# Patient Record
(demographics unavailable — no encounter records)

---

## 2017-10-30 NOTE — MM
Reason for exam: screening  (asymptomatic).

Last mammogram was performed 1 year and 7 months ago.



History:

Patient is postmenopausal.



Physical Findings:

A clinical breast exam by your physician is recommended on an annual basis and 

results should be correlated with mammographic findings.



MG Screening Mammo w CAD

Bilateral CC and MLO view(s) were taken.

Prior study comparison: March 17, 2016, mammogram, performed at [facility].

There are scattered fibroglandular densities.  Asymmetric breast tissue in the 

left breast.  No significant changes when compared with prior studies.





ASSESSMENT: Benign, BI-RAD 2



RECOMMENDATION:

Routine screening mammogram of both breasts in 1 year.

## 2019-04-30 NOTE — MM
Reason for exam: screening  (asymptomatic).

Last mammogram was performed 1 year and 6 months ago.



History:

Patient is postmenopausal.



Physical Findings:

A clinical breast exam by your physician is recommended on an annual basis and 

results should be correlated with mammographic findings.



MG Screening Mammo w CAD

Bilateral CC and MLO view(s) were taken.

Prior study comparison: October 23, 2017, bilateral MG screening mammo w CAD.  

March 17, 2016, mammogram, performed at [facility].

There are scattered fibroglandular densities.  No significant changes when 

compared with prior studies.





ASSESSMENT: Negative, BI-RAD 1



RECOMMENDATION:

Routine screening mammogram of both breasts in 1 year.

## 2019-07-08 NOTE — CT
EXAMINATION TYPE: CT lumbar spine wo con

 

DATE OF EXAM: 7/8/2019

 

COMPARISON:

 

HISTORY: Pain since pain stimulator replacement 6 months ago.

 

CT DLP: 1197.6 mGycm

 

CONTRAST: 

CT scan of the lumbar is performed , patient injected with mL of .

 

TECHNIQUE: CT of the lumbar spine is performed on a spiral scan at 3 mm thick sections. Reconstructed
 images are performed in the coronal and sagittal planes.  

 

FINDINGS: 

 

T11-T12: There is some right paracentral disc bulging with mild anterior thecal sac compression. Luisito
elate with right radicular symptoms.

 

T12-L1: No focal disc herniation or significant disc bulge is evident.   No spinal canal stenosis or 
neural foraminal stenosis is present.

 

L1-L2: No focal disc herniation or significant disc bulge is evident.   No spinal canal stenosis or n
eural foraminal stenosis is present

 

L2-L3: Minimal disc bulge may have anterior thecal sac contact. Facet hypertrophy is present. No spin
al canal stenosis or neural foraminal stenosis is present

 

L3-L4: Mild disc bulge has anterior thecal sac contact. No AP spinal canal stenosis present. Facet hy
pertrophy is present. Neural foramen are patent

 

L4-L5: Broad-based disc bulge has anterior thecal sac contact. No spinal canal stenosis is present. T
here is moderate left and mild right foraminal narrowing due to disc bulging and facet hypertrophy. S
ome posterior lateral thecal sac contact from facet hypertrophy and ligamentum flavum laxity is prese
nt.

 

L5-S1: Some hard disc material has anterior thecal sac contact at L5-S1. There is narrowing of disc h
eight with some vacuum disc phenomenon. No spinal canal stenosis present. Facet changes are present.

 

Vertebral alignment appears normal. Note is made of aortoiliac stenting sacroiliac joint degenerative
 changes are also present.

 

Patient's thoracic stimulator is out of the field-of-view. On the  images of the leads enter in 
the lower thoracic region are out of the field-of-view is well.

 

IMPRESSION:

Disc bulging appears greatest at L4-5 with anterior thecal sac contact and may be contributing to nadege
e moderate left foraminal stenosis. Correlate with the radicular symptoms.

2. Note is made of some right paracentral disc bulging T11-T12. Correlate with right radicular sympto
ms at this level.

## 2019-07-08 NOTE — XR
EXAM TYPE: LUMBAR SPINE X RAY SERIES

 

COMPARISON: None

 

HISTORY: Pain

 

TECHNIQUE: 4 views are submitted.

 

FINDINGS:

Alignment is anatomic.  The pedicles are intact.  The transverse processes are intact.  There is a pa
in stimulator wires seen. Surgical clips in the gallbladder fossa. Multilevel degenerative disc disea
se seen. Vascular stent noted. There is facet arthropathy at multiple levels most marked at L5-S1. No
 compression deformities. Anterior hypertrophic spurring noted.

 

IMPRESSION:

1. Multilevel degenerative disc disease and facet arthropathy

## 2019-08-21 NOTE — CT
EXAMINATION TYPE: CT angio abd aorta w/Runoff

 

DATE OF EXAM: 8/21/2019

 

HISTORY: Occulsion disease lower extremities

 

CT DLP: 2051.8 mGycm

 

CONTRAST: 

CTA thoracic and abdominal aorta with 3-D reconstruction is performed and without and with IV Contras
t, patient injected with 125 mL of Isovue 370.

 

Contrast CTA of the abdominal aorta with runoff of the lower extremity arterial system was performed 
from the lung bases through the ankles and feet. 3-D reconstruction imaging obtained at a separate wo
rkstation.  

 

Abdominal aorta and iliac arteries: Aortic bifemoral bypass graft is noted to be in place and patent.
 No evidence for aneurysm or leak. No pathologic enhancement to suggest infection. Celiac, SMA and re
nal arteries are patent.

 

Femoral arteries: Common femoral arteries and profunda femoris arteries are patent bilaterally with o
nly minimal plaque formation seen. No hemodynamically significant stenosis identified.

 

Popliteal arteries: Popliteal arteries are patent bilaterally with only minimal plaque formation note
d.

 

Below the knee arteries: Trifurcation is patent bilaterally. Peroneal, anterior and posterior tibial 
arteries demonstrate minimal calcific disease without evidence for hemodynamically significant stenos
is. Limited runoff of the ankles and feet given timing of the contrast bolus.

 

LIVER/GB-hepatomegaly with underlying hepatic steatosis.

 

PANCREAS-  No significant abnormality is seen. 

 

SPLEEN-  No significant abnormality is seen. 

 

ADRENALS-  No significant abnormality is seen. 

 

KIDNEYS/BLADDER-  No significant abnormality is seen. 

 

BOWEL-  No Significant abnormality 

 

GENITAL ORGANS: No gross abnormality seen. 

 

LYMPH NODES-  No greater than 1cm abdominal or pelvic lymph nodes are appreciated. 

 

OSSEOUS STRUCTURES-generative changes lumbar spine.

 

OTHER- No significant abnormality is seen. 

 

IMPRESSION-

 

1. Aortobifemoral bypass graft is patent and intact without evidence for aneurysm, leak or infection.
 Minimal plaque noted without hemodynamically significant stenosis of the runoff vessels.

2. Hepatomegaly with underlying hepatic steatosis.

## 2019-11-17 NOTE — CT
EXAMINATION TYPE: CT angio chest

 

DATE OF EXAM: 11/17/2019 1:23 PM

 

COMPARISON: None.

 

HISTORY: Chest pain, SOB, recent surgery

 

CT DLP: 449 mGycm

Automated exposure control for dose reduction was used.

 

CONTRAST: 

CTA scan of the thorax is performed with IV Contrast, patient injected with 69 mL of Isovue 370, pulm
onary embolism protocol. .  

 

FINDINGS: There are diffuse emphysematous changes throughout the lungs. There is groundglass opacity 
throughout the lungs the aorta is normal in caliber without evidence of dissection. There is no pleur
al or pericardial fluid. The heart is not enlarged.

 

Visualized portions of the upper abdomen are unremarkable.

 

There is hypertrophic spondylosis within the spine.

 

IMPRESSION: 

1. THIS EXAMINATION IS NEGATIVE FOR PULMONARY EMBOLUS.

2. DIFFUSE BULLOUS EMPHYSEMATOUS CHANGE WITH GROUNDGLASS OPACITY SUGGESTING ONGOING ALVEOLITIS.

## 2019-11-17 NOTE — P.HPIM
History of Present Illness


H&P Date: 11/17/19


Chief Complaint: Chest and arm pain


65-year-old female presenting to the emergency Department with complaints of 

chest discomfort.  Discomfort feels like an ache.  Onset of symptoms was a day 

or so ago.  Discomfort is right-sided with radiation through to the back.  

Discomfort also rates the shoulder and down the arm.  Patient may feel slightly 

short of breath.  Patient has had cough recently with white sputum.  No fevers. 

No nausea.  No history of similar symptoms previously.  Chest discomfort does 

increase with cough and occasionally deep breaths.


Workup in ED with EKG showing normal sinus rhythm no acute ST-T wave changes; 

lab work essentially unremarkable except for slight hyperglycemia of 130 and an 

elevated d-dimer of 0.9; troponin is found to be less than 0.012; CT of the 

chest was done which was negative for PE


Patient is being admitted to hospital for further workup on chest pain





Review of Systems











REVIEW OF SYSTEMS: 


CONSTITUTIONAL: No fever, no malaise, no fatigue. 


HEENT: No recent visual problems or hearing problems. Denied any sore throat. 


CARDIOVASCULAR: No chest pain, orthopnea, PND, no palpitations, no syncope. 


PULMONARY: No shortness of breath, no cough, no hemoptysis. 


GASTROINTESTINAL: No diarrhea, no nausea, no vomiting, no abdominal pain. 


NEUROLOGICAL: No headaches, no weakness, no numbness. 


HEMATOLOGICAL: Denies any bleeding or petechiae. 


GENITOURINARY: Denies any burning micturition, frequency, or urgency. 


MUSCULOSKELETAL/RHEUMATOLOGICAL: Denies any joint pain, swelling, or any muscle 

pain. 


ENDOCRINE: Denies any polyuria or polydipsia. 





The rest of the 14-point review of systems is negative.





Past Medical History


Past Medical History: Coronary Artery Disease (CAD), GERD/Reflux, Hypertension, 

Liver Disease


Additional Past Medical History / Comment(s): NERVE DAMAGE AND PAIN, anemia


History of Any Multi-Drug Resistant Organisms: None Reported


Past Surgical History: Cholecystectomy, Hysterectomy, Orthopedic Surgery, 

Tonsillectomy


Additional Past Surgical History / Comment(s): AORTIC SURGERY, pt has a pain 

stimulator and it has been moved, B heels, L femoral endartectomy


Past Psychological History: Depression


Smoking Status: Former smoker


Past Alcohol Use History: None Reported


Past Drug Use History: None Reported





Medications and Allergies


                                Home Medications











 Medication  Instructions  Recorded  Confirmed  Type


 


Cetirizine HCl [Zyrtec] 10 mg PO DAILY 11/17/19 11/17/19 History


 


Cholecalciferol [Vitamin D3 (25 1,000 unit PO DAILY 11/17/19 11/17/19 History





Mcg = 1000 Iu)]    


 


Ferrous Sulfate [Feosol] 325 mg PO DAILY 11/17/19 11/17/19 History


 


Hydrochlorothiazide [Hydrodiuril] 25 mg PO DAILY 11/17/19 11/17/19 History


 


Pantoprazole Sodium [Protonix] 40 mg PO DAILY 11/17/19 11/17/19 History


 


Sertraline [Zoloft] 100 mg PO DAILY 11/17/19 11/17/19 History








                                    Allergies











Allergy/AdvReac Type Severity Reaction Status Date / Time


 


albuterol Allergy  Rash/Hives Verified 11/17/19 13:05


 


cortisone Allergy  Swelling Verified 11/17/19 13:05


 


erythromycin base Allergy  Rash/Hives Verified 11/17/19 13:05


 


fentanyl [From Duragesic] Allergy  Unknown Verified 11/17/19 13:05





   Childhood  


 


iodine Allergy  Anaphylaxis Verified 11/17/19 13:05


 


modafinil Allergy  Unknown Verified 11/17/19 13:05


 


nystatin Allergy  Rash/Hives Verified 11/17/19 13:05


 


paroxetine [From Paxil] Allergy  Unknown Verified 11/17/19 13:05


 


vancomycin Allergy  Rash/Hives Verified 11/17/19 13:05


 


aspirin AdvReac  Unknown Verified 11/17/19 13:05


 


morphine AdvReac  Diarrhea Verified 11/17/19 13:05


 


oxcarbazepine AdvReac  Unknown Verified 11/17/19 13:05





[From Trileptal]     


 


procaine [From Novocain] AdvReac  Swelling Verified 11/17/19 13:05














Physical Exam


Vitals: 


                                   Vital Signs











  Temp Pulse Resp BP Pulse Ox


 


 11/17/19 15:30   68  16  154/74  92 L


 


 11/17/19 15:00   68  18  158/81  91 L


 


 11/17/19 14:30   88  16  152/80  87 L


 


 11/17/19 14:00   66  16  151/66  89 L


 


 11/17/19 13:49   79  16  151/66  95


 


 11/17/19 13:30   74  16  148/92  88 L


 


 11/17/19 13:00   77  16  159/87  93 L


 


 11/17/19 12:47   72  20  159/87  96


 


 11/17/19 12:30   76  16  96/58  96


 


 11/17/19 12:00   79  15  167/81  95


 


 11/17/19 11:30   77  16   94 L


 


 11/17/19 11:28  98.1 F  80  16  166/86  94 L


 


 11/17/19 10:44  98.1 F  78  18  193/81  95








                                Intake and Output











 11/17/19 11/17/19 11/17/19





 06:59 14:59 22:59


 


Other:   


 


  Weight  87.997 kg 














- Constitutional


General appearance: Present: average body habitus, cooperative, no acute 

distress


- EENT


Eyes: Present: anicteric sclerae, EOMI, PERRLA, normal appearance


ENT: Present: hearing grossly normal, normal oropharynx


Ears: bilateral: normal


- Neck


Neck: Present: normal ROM.  Absent: lymphadenopathy, rigidity, thyromegaly


Carotids: negative: bruit present


Thyroid: bilateral: normal size, negative: enlarged, nodule


- Respiratory


Respiratory: bilateral: CTA, negative: rales, rhonchi, wheezing


- Cardiovascular


Rhythm: regular


Heart sounds: normal: S1, S2


Abnormal Heart Sounds: Absent: systolic murmur, diastolic murmur


- Gastrointestinal


General gastrointestinal: Present: normal bowel sounds, soft.  Absent: diste

nded, organomegaly, tenderness


- Genitourinary


Genitourinary Comment(s): deferred


- Integumentary


Integumentary: Present: normal turgor.  Absent: jaundiced, rash, ulcer


- Neurologic


Neurologic: Present: CNII-XII intact.  Absent: focal deficits


- Musculoskeletal


Musculoskeletal: Present: gait normal, strength equal bilaterally


- Psychiatric


Psychiatric: Present: A&O x's 3, appropriate affect, intact judgment & insight








Results


CBC & Chem 7: 


                                 11/17/19 11:26





                                 11/17/19 11:26


Labs: 


                  Abnormal Lab Results - Last 24 Hours (Table)











  11/17/19 11/17/19 Range/Units





  11:26 11:26 


 


D-Dimer   0.90 H  (<0.60)  mg/L FEU


 


Glucose  130 H   (74-99)  mg/dL


 


AST  52 H   (14-36)  U/L


 


ALT  70 H   (9-52)  U/L














Assessment and Plan


Assessment: 


1.  Chest pain rule out acute coronary syndrome


- We will admit patient to selective care; we will monitor and trend troponin 

every 43 with continuous monitoring of EKG; start patient on aspirin and 

nitrates; 2-D echocardiogram; consult cardiology for further recommendations





2.  Acute exacerbation COPD; patient was given Solu-Medrol 125 mg 1 in ED; we 

will continue with Solu-Medrol 40 mg IV twice a day with bronchodilator 

nebulizer treatments





3.  Hypertension; continue with home dose of hydrochlorothiazide 25 mg daily





4.  Gastroesophageal reflux disease; Tenex 40 mg daily





5.  Depression; Zoloft 100 mg daily





DVT prophylaxis; SCDs





CODE STATUS; full code





Time with Patient: Greater than 30

## 2019-11-17 NOTE — XR
EXAMINATION TYPE: XR chest 2V

 

DATE OF EXAM: 11/17/2019

 

HISTORY: Chest Pain.

 

REFERENCE: Previous study dated 3/25/2014.

 

FINDINGS: Pain stimulator projects over the mid thoracic spine.

 

The lungs are clear. Pleural spaces are clear. Heart size upper limits of normal.

 

IMPRESSION: 

 

BORDERLINE CARDIOMEGALY.

## 2019-11-17 NOTE — ED
General Adult HPI





- General


Chief complaint: Chest Pain


Stated complaint: chest/back/arm pain


Time Seen by Provider: 11/17/19 10:44


Source: patient, family, RN notes reviewed


Mode of arrival: wheelchair


Limitations: no limitations





- History of Present Illness


Initial comments: 





Patient is a pleasant 65-year-old female presenting to the emergency Department 

with complaints of chest discomfort.  Discomfort feels like an ache.  Onset of 

symptoms was a day or so ago.  Discomfort is right-sided with radiation through 

to the back.  Discomfort also rates the shoulder and down the arm.  Patient may 

feel slightly short of breath.  Patient has had cough recently with white 

sputum.  No fevers.  No nausea.  No history of similar symptoms previously.  

Chest discomfort does increase with cough and occasionally deep breaths.





- Related Data


                                Home Medications











 Medication  Instructions  Recorded  Confirmed


 


Cetirizine HCl [Zyrtec] 10 mg PO DAILY 11/17/19 11/17/19


 


Cholecalciferol [Vitamin D3 (25 1,000 unit PO DAILY 11/17/19 11/17/19





Mcg = 1000 Iu)]   


 


Ferrous Sulfate [Feosol] 325 mg PO DAILY 11/17/19 11/17/19


 


Hydrochlorothiazide [Hydrodiuril] 25 mg PO DAILY 11/17/19 11/17/19


 


Pantoprazole Sodium [Protonix] 40 mg PO DAILY 11/17/19 11/17/19


 


Sertraline [Zoloft] 100 mg PO DAILY 11/17/19 11/17/19











                                    Allergies











Allergy/AdvReac Type Severity Reaction Status Date / Time


 


albuterol Allergy  Rash/Hives Verified 11/17/19 13:05


 


cortisone Allergy  Swelling Verified 11/17/19 13:05


 


erythromycin base Allergy  Rash/Hives Verified 11/17/19 13:05


 


fentanyl [From Duragesic] Allergy  Unknown Verified 11/17/19 13:05





   Childhood  


 


iodine Allergy  Anaphylaxis Verified 11/17/19 13:05


 


modafinil Allergy  Unknown Verified 11/17/19 13:05


 


nystatin Allergy  Rash/Hives Verified 11/17/19 13:05


 


paroxetine [From Paxil] Allergy  Unknown Verified 11/17/19 13:05


 


vancomycin Allergy  Rash/Hives Verified 11/17/19 13:05


 


aspirin AdvReac  Unknown Verified 11/17/19 13:05


 


morphine AdvReac  Diarrhea Verified 11/17/19 13:05


 


oxcarbazepine AdvReac  Unknown Verified 11/17/19 13:05





[From Trileptal]     


 


procaine [From Novocain] AdvReac  Swelling Verified 11/17/19 13:05














Review of Systems


ROS Statement: 


Those systems with pertinent positive or pertinent negative responses have been 

documented in the HPI.





ROS Other: All systems not noted in ROS Statement are negative.


Constitutional: Denies: fever


Eyes: Denies: eye pain


ENT: Denies: ear pain


Respiratory: Reports: as per HPI, cough


Cardiovascular: Reports: chest pain


Endocrine: Denies: fatigue


Gastrointestinal: Denies: abdominal pain


Genitourinary: Denies: dysuria


Musculoskeletal: Reports: as per HPI


Skin: Denies: rash


Neurological: Denies: weakness





Past Medical History


Past Medical History: Coronary Artery Disease (CAD), GERD/Reflux, Hypertension, 

Liver Disease


Additional Past Medical History / Comment(s): NERVE DAMAGE AND PAIN, anemia


History of Any Multi-Drug Resistant Organisms: None Reported


Past Surgical History: Cholecystectomy, Hysterectomy, Orthopedic Surgery, T

onsillectomy


Additional Past Surgical History / Comment(s): AORTIC SURGERY, pt has a pain 

stimulator and it has been moved, B heels, L femoral endartectomy


Past Psychological History: Depression


Smoking Status: Former smoker


Past Alcohol Use History: None Reported


Past Drug Use History: None Reported





General Exam


Limitations: no limitations


General appearance: alert, in no apparent distress


Head exam: Present: normocephalic


Eye exam: Present: normal appearance


Neck exam: Present: normal inspection


Respiratory exam: Present: normal lung sounds bilaterally.  Absent: chest wall 

tenderness


Cardiovascular Exam: Present: regular rate, normal rhythm


  ** Expanded


Peripheral pulses: 2+: Radial (R), Radial (L), Posterior Tibialis (R), Posterior

Tibialis (L)


GI/Abdominal exam: Present: soft.  Absent: tenderness


Extremities exam: Present: normal inspection.  Absent: pedal edema, calf 

tenderness


Back exam: Present: normal inspection.  Absent: tenderness


Neurological exam: Present: alert


Psychiatric exam: Present: normal affect, normal mood


Skin exam: Present: normal color





Course


                                   Vital Signs











  11/17/19 11/17/19 11/17/19





  10:44 11:28 12:47


 


Temperature 98.1 F 98.1 F 


 


Pulse Rate 78 79 72


 


Respiratory 18 20 20





Rate   


 


Blood Pressure 193/81 166/86 159/87


 


O2 Sat by Pulse 95 95 96





Oximetry   














  11/17/19





  13:49


 


Temperature 


 


Pulse Rate 79


 


Respiratory 16





Rate 


 


Blood Pressure 151/66


 


O2 Sat by Pulse 95





Oximetry 














- Reevaluation(s)


Reevaluation #1: 





11/17/19 12:26


Patient reevaluated and resting comfortably in bed.  Patient states no 

improvement of symptoms and symptoms are more midline in her chest at this 

point.  Patient states aspirin reported ALLERGY was from history of stomach 

ulcers.  Patient and family updated on need for this as well as need for 

computed tomography scan with iodine.  They are aware this is the best available

test and in agreement.





EKG Findings





- EKG Comments:


EKG Findings:: sinus rhythm 71.  .  QRS 76.  QT 44.  .  Normal 

axis.  Normal QRS.  No acute ST change.





Medical Decision Making





- Medical Decision Making





Patient reevaluated and updated.  Patient is now resting comfortably in bed.  

Case was discussed in detail with Dr. Sahu, who will admit covering for this. 

Patient.  Cardiology will be placed on consult.





- Lab Data


Result diagrams: 


                                 11/17/19 11:26





                                 11/17/19 11:26


                                   Lab Results











  11/17/19 11/17/19 11/17/19 Range/Units





  11:26 11:26 11:26 


 


WBC  4.7    (3.8-10.6)  k/uL


 


RBC  5.24    (3.80-5.40)  m/uL


 


Hgb  14.5    (11.4-16.0)  gm/dL


 


Hct  43.4    (34.0-46.0)  %


 


MCV  82.8    (80.0-100.0)  fL


 


MCH  27.7    (25.0-35.0)  pg


 


MCHC  33.5    (31.0-37.0)  g/dL


 


RDW  14.6    (11.5-15.5)  %


 


Plt Count  169    (150-450)  k/uL


 


Neutrophils %  62    %


 


Lymphocytes %  27    %


 


Monocytes %  5    %


 


Eosinophils %  3    %


 


Basophils %  1    %


 


Neutrophils #  3.0    (1.3-7.7)  k/uL


 


Lymphocytes #  1.3    (1.0-4.8)  k/uL


 


Monocytes #  0.2    (0-1.0)  k/uL


 


Eosinophils #  0.1    (0-0.7)  k/uL


 


Basophils #  0.0    (0-0.2)  k/uL


 


PT    9.9  (9.0-12.0)  sec


 


INR    0.9  (<1.2)  


 


APTT    23.1  (22.0-30.0)  sec


 


D-Dimer    0.90 H  (<0.60)  mg/L FEU


 


Sodium   139   (137-145)  mmol/L


 


Potassium   3.5   (3.5-5.1)  mmol/L


 


Chloride   102   ()  mmol/L


 


Carbon Dioxide   27   (22-30)  mmol/L


 


Anion Gap   10   mmol/L


 


BUN   16   (7-17)  mg/dL


 


Creatinine   0.82   (0.52-1.04)  mg/dL


 


Est GFR (CKD-EPI)AfAm   87   (>60 ml/min/1.73 sqM)  


 


Est GFR (CKD-EPI)NonAf   75   (>60 ml/min/1.73 sqM)  


 


Glucose   130 H   (74-99)  mg/dL


 


Calcium   9.9   (8.4-10.2)  mg/dL


 


Magnesium   1.6   (1.6-2.3)  mg/dL


 


Total Bilirubin   0.6   (0.2-1.3)  mg/dL


 


AST   52 H   (14-36)  U/L


 


ALT   70 H   (9-52)  U/L


 


Alkaline Phosphatase   114   ()  U/L


 


Troponin I     (0.000-0.034)  ng/mL


 


NT-Pro-B Natriuret Pep     pg/mL


 


Total Protein   7.3   (6.3-8.2)  g/dL


 


Albumin   4.5   (3.5-5.0)  g/dL














  11/17/19 11/17/19 Range/Units





  11:26 11:26 


 


WBC    (3.8-10.6)  k/uL


 


RBC    (3.80-5.40)  m/uL


 


Hgb    (11.4-16.0)  gm/dL


 


Hct    (34.0-46.0)  %


 


MCV    (80.0-100.0)  fL


 


MCH    (25.0-35.0)  pg


 


MCHC    (31.0-37.0)  g/dL


 


RDW    (11.5-15.5)  %


 


Plt Count    (150-450)  k/uL


 


Neutrophils %    %


 


Lymphocytes %    %


 


Monocytes %    %


 


Eosinophils %    %


 


Basophils %    %


 


Neutrophils #    (1.3-7.7)  k/uL


 


Lymphocytes #    (1.0-4.8)  k/uL


 


Monocytes #    (0-1.0)  k/uL


 


Eosinophils #    (0-0.7)  k/uL


 


Basophils #    (0-0.2)  k/uL


 


PT    (9.0-12.0)  sec


 


INR    (<1.2)  


 


APTT    (22.0-30.0)  sec


 


D-Dimer    (<0.60)  mg/L FEU


 


Sodium    (137-145)  mmol/L


 


Potassium    (3.5-5.1)  mmol/L


 


Chloride    ()  mmol/L


 


Carbon Dioxide    (22-30)  mmol/L


 


Anion Gap    mmol/L


 


BUN    (7-17)  mg/dL


 


Creatinine    (0.52-1.04)  mg/dL


 


Est GFR (CKD-EPI)AfAm    (>60 ml/min/1.73 sqM)  


 


Est GFR (CKD-EPI)NonAf    (>60 ml/min/1.73 sqM)  


 


Glucose    (74-99)  mg/dL


 


Calcium    (8.4-10.2)  mg/dL


 


Magnesium    (1.6-2.3)  mg/dL


 


Total Bilirubin    (0.2-1.3)  mg/dL


 


AST    (14-36)  U/L


 


ALT    (9-52)  U/L


 


Alkaline Phosphatase    ()  U/L


 


Troponin I   <0.012  (0.000-0.034)  ng/mL


 


NT-Pro-B Natriuret Pep  100   pg/mL


 


Total Protein    (6.3-8.2)  g/dL


 


Albumin    (3.5-5.0)  g/dL














- Radiology Data


Radiology results: report reviewed (Chest CT concerning for alveolar ), image 

reviewed (Chest x-ray shows borderline cardiomegaly)





Disposition


Clinical Impression: 


 Chest pain, Pulmonary alveolitis





Disposition: ADMITTED AS IP TO THIS HOSP


Is patient prescribed a controlled substance at d/c from ED?: No


Referrals: 


[facility] [Primary Care Provider] - 1-2 days


Decision Time: 14:48

## 2019-11-18 NOTE — P.CRDCN
History of Present Illness


Consult date: 11/18/19


Chief complaint: Chest pain


History of present illness: 





This is a pleasant 65-year-old female patient with a past medical history 

significant for hypertension and history of peripheral arterial disease where 

the patient underwent in the past and aortobifem bypass presented to the 

hospital complaining of chest discomfort.  The patient was in her usual state of

health until yesterday when she was making bread at home and started 

experiencing chest discomfort, she describes mainly over the right side of the 

chest, with some radiation to the arm, and she described the discomfort as sharp

kind of discomfort.  No associated symptoms of shortness of breath, dizziness, 

sweating, heart racing or fluttering, or syncope.  The chest discomfort con

tinues and because of that she decided to come to the emergency room.  The EKG 

showed sinus rhythm without any significant ST or T-wave abnormalities.  The 

blood work came in to be unremarkable into of troponin.  D-dimer came in to be 

elevated but the computed tomography scan of the chest showed no acute PE.  The 

rest of her blood work came in to be unremarkable.  The chest x-ray came in to 

be unremarkable as well.  Please note that the blood pressure has been elevated 

when she presented to the hospital and during her hospital stay.  Currently she 

is on hydrochlorothiazide which was restarted.  The patient stated that she 

tried multiple blood pressure medication in the past including lisinopril, 

Norvasc, metoprolol, and labetalol, and she could not tolerate any of them.  She

had them written down.  Also she stated that she was admitted to [facility] in March 2019 and she underwent over the last stress 

test and echocardiogram, she was seen by Dr. Dillard in the office.  He 

advised no further workup.  The patient stated that she was told that she 

doesn't need to have heart catheterization but the history is a bit vague and I 

am going to obtain her previous medical records from [facility] as well as from the office.  Currently she still have chest discomfort 

but the pressure continues to be elevated.





Past Medical History


Past Medical History: Coronary Artery Disease (CAD), GERD/Reflux, Hypertension, 

Liver Disease


Additional Past Medical History / Comment(s): NERVE DAMAGE AND PAIN, anemia


History of Any Multi-Drug Resistant Organisms: None Reported


Past Surgical History: Cholecystectomy, Hysterectomy, Orthopedic Surgery, 

Tonsillectomy


Additional Past Surgical History / Comment(s): AORTIC SURGERY, pt has a pain 

stimulator and it has been moved, B heels, L femoral endartectomy


Past Anesthesia/Blood Transfusion Reactions: No Reported Reaction


Past Psychological History: Depression


Smoking Status: Former smoker


Past Alcohol Use History: None Reported


Past Drug Use History: None Reported





- Past Family History


  ** Mother


Family Medical History: No Reported History





  ** Father


Family Medical History: Coronary Artery Disease (CAD)





Medications and Allergies


                                Home Medications











 Medication  Instructions  Recorded  Confirmed  Type


 


Cetirizine HCl [Zyrtec] 10 mg PO DAILY 11/17/19 11/17/19 History


 


Cholecalciferol [Vitamin D3 (25 1,000 unit PO DAILY 11/17/19 11/17/19 History





Mcg = 1000 Iu)]    


 


Ferrous Sulfate [Feosol] 325 mg PO DAILY 11/17/19 11/17/19 History


 


Hydrochlorothiazide [Hydrodiuril] 25 mg PO DAILY 11/17/19 11/17/19 History


 


Pantoprazole Sodium [Protonix] 40 mg PO DAILY 11/17/19 11/17/19 History


 


Sertraline [Zoloft] 100 mg PO DAILY 11/17/19 11/17/19 History








                                    Allergies











Allergy/AdvReac Type Severity Reaction Status Date / Time


 


albuterol Allergy  Rash/Hives Verified 11/17/19 13:05


 


cortisone Allergy  Swelling Verified 11/17/19 13:05


 


erythromycin base Allergy  Rash/Hives Verified 11/17/19 13:05


 


fentanyl [From Duragesic] Allergy  Unknown Verified 11/17/19 13:05





   Childhood  


 


iodine Allergy  Anaphylaxis Verified 11/17/19 13:05


 


modafinil Allergy  Unknown Verified 11/17/19 13:05


 


nystatin Allergy  Rash/Hives Verified 11/17/19 13:05


 


paroxetine [From Paxil] Allergy  Unknown Verified 11/17/19 13:05


 


vancomycin Allergy  Rash/Hives Verified 11/17/19 13:05


 


aspirin AdvReac  Unknown Verified 11/17/19 13:05


 


morphine AdvReac  Diarrhea Verified 11/17/19 13:05


 


oxcarbazepine AdvReac  Unknown Verified 11/17/19 13:05





[From Trileptal]     


 


procaine [From Novocain] AdvReac  Swelling Verified 11/17/19 13:05














Physical Exam


Vitals: 


                                   Vital Signs











  Temp Pulse Pulse Resp BP BP Pulse Ox


 


 11/18/19 08:00  97.8 F   75  18   175/90  95


 


 11/18/19 04:00  98.8 F   73  18   167/80  92 L


 


 11/17/19 23:53  98.3 F   82  16   197/90  96


 


 11/17/19 20:00  98.7 F   76  17   211/91  97


 


 11/17/19 17:40       156/66 


 


 11/17/19 16:11  98.5 F   74  16   207/89  90 L


 


 11/17/19 16:00  98.3 F   74  16   207/89  90 L


 


 11/17/19 15:30   68   16  154/74   92 L


 


 11/17/19 15:00   68   18  158/81   91 L


 


 11/17/19 14:30   88   16  152/80   87 L


 


 11/17/19 14:00   66   16  151/66   89 L


 


 11/17/19 13:49   79   16  151/66   95


 


 11/17/19 13:30   74   16  148/92   88 L


 


 11/17/19 13:00   77   16  159/87   93 L


 


 11/17/19 12:47   72   20  159/87   96


 


 11/17/19 12:30   76   16  96/58   96


 


 11/17/19 12:00   79   15  167/81   95


 


 11/17/19 11:30   77   16    94 L


 


 11/17/19 11:28  98.1 F  80   16  166/86   94 L


 


 11/17/19 10:44  98.1 F  78   18  193/81   95








                                Intake and Output











 11/17/19 11/18/19 11/18/19





 22:59 06:59 14:59


 


Intake Total 120  


 


Output Total  1400 


 


Balance 120 -1400 


 


Intake:   


 


  Oral 120  


 


Output:   


 


  Urine  1400 


 


Other:   


 


  Voiding Method Toilet  


 


  # Voids 1 1 


 


  Weight  88 kg 














- Constitutional


General appearance: no acute distress





- Respiratory


Respiratory: bilateral: CTA





- Cardiovascular


Rhythm: regular


Heart sounds: normal: S1, S2





Results





                                 11/18/19 04:18





                                 11/18/19 04:18


                                 Cardiac Enzymes











  11/17/19 11/17/19 11/17/19 Range/Units





  11:26 11:26 17:24 


 


AST  52 H    (14-36)  U/L


 


Troponin I   <0.012  <0.012  (0.000-0.034)  ng/mL














  11/17/19 Range/Units





  23:11 


 


AST   (14-36)  U/L


 


Troponin I  <0.012  (0.000-0.034)  ng/mL








                                   Coagulation











  11/17/19 Range/Units





  11:26 


 


PT  9.9  (9.0-12.0)  sec


 


APTT  23.1  (22.0-30.0)  sec








                                     Lipids











  11/18/19 Range/Units





  04:18 


 


Triglycerides  156 H  (<150)  mg/dL


 


Cholesterol  254 H  (<200)  mg/dL


 


HDL Cholesterol  52  (40-60)  mg/dL








                                       CBC











  11/17/19 11/18/19 Range/Units





  11:26 04:18 


 


WBC  4.7  7.4  (3.8-10.6)  k/uL


 


RBC  5.24  4.91  (3.80-5.40)  m/uL


 


Hgb  14.5  13.5  (11.4-16.0)  gm/dL


 


Hct  43.4  40.4  (34.0-46.0)  %


 


Plt Count  169  188  (150-450)  k/uL








                          Comprehensive Metabolic Panel











  11/17/19 11/18/19 Range/Units





  11:26 04:18 


 


Sodium  139  138  (137-145)  mmol/L


 


Potassium  3.5  3.9  (3.5-5.1)  mmol/L


 


Chloride  102  98  ()  mmol/L


 


Carbon Dioxide  27  30  (22-30)  mmol/L


 


BUN  16  15  (7-17)  mg/dL


 


Creatinine  0.82  0.88  (0.52-1.04)  mg/dL


 


Glucose  130 H  146 H  (74-99)  mg/dL


 


Calcium  9.9  10.0  (8.4-10.2)  mg/dL


 


AST  52 H   (14-36)  U/L


 


ALT  70 H   (9-52)  U/L


 


Alkaline Phosphatase  114   ()  U/L


 


Total Protein  7.3   (6.3-8.2)  g/dL


 


Albumin  4.5   (3.5-5.0)  g/dL








                               Current Medications











Generic Name Dose Route Start Last Admin





  Trade Name Freq  PRN Reason Stop Dose Admin


 


Aspirin  325 mg  11/18/19 09:00 





  Aspirin  PO  





  DAILY TERESO  


 


Morphine Sulfate  4 mg  11/17/19 20:32  11/18/19 02:09





  Morphine Sulfate (Inj)  IVP   4 mg





  Q4HR PRN   Administration





  Pain  


 


Nitroglycerin  0.4 mg  11/17/19 14:53 





  Nitrostat  SUBLINGUAL  





  Q5M PRN  





  Chest Pain  


 


Nitroglycerin  1 inch  11/17/19 18:00  11/18/19 06:26





  Nitro-Bid Oint  TOPICAL   1 inch





  Q6HR TERESO   Administration


 


Sodium Chloride  10 ml  11/17/19 21:00  11/18/19 06:27





  Saline Flush  IV   10 ml





  BID TERESO   Administration








                                Intake and Output











 11/17/19 11/18/19 11/18/19





 22:59 06:59 14:59


 


Intake Total 120  


 


Output Total  1400 


 


Balance 120 -1400 


 


Intake:   


 


  Oral 120  


 


Output:   


 


  Urine  1400 


 


Other:   


 


  Voiding Method Toilet  


 


  # Voids 1 1 


 


  Weight  88 kg 








                                        





                                 11/18/19 04:18 





                                 11/18/19 04:18 











Assessment and Plan


Assessment: 





Assessment


#1 chest discomfort


#2 peripheral arterial disease


#3 hypertension





Plan


#1 acute coronary syndrome was ruled out


#2 PE was ruled out


#3 add carvedilol to the current medical regimen


#4 obtain the previous medical records from [facility] as well as

from the office


#5, severe coronary angiogram she continues to have chest discomfort





Thank you for allowing us participate in her care

## 2019-11-18 NOTE — P.PN
Subjective





This is a pleasant 65 years old female with past medical history of COPD, ex-

cigarette smoker, coronary artery disease, hypertension, liver disease, 

neuropathy on nerve stimulator on the right flank for 6 years, recently changed 

position by her doctor at [facility].  She presents because of right-sided chest 

pain.  Also associated with some dyspnea and cough with white phlegm that is 

been going on since she had a procedure to switch her stimulator in the right 

flank on early October 2019.  Patient chest pain looks better than when she came

in however still bothering her as it came down from 8/10-5-60/10.  She still 

have difficulty catching her breath.


Patient have seen Dr. Fishman before and after pulmonary function test told her

she has COPD.  She is telling me she quit smoking about 15 years ago.


Patient has multiple medical ALLERGIES to medication including antihypertensive 

indication, cortisone, iodine, erythromycin, however she agrees to take 

prednisone as she is taken it before, and doxycycline.


Cardiologist but trying to get records from previous hospitalizations/office 

visit


[place] looks stable however her blood pressure is 175/90.  Labs are unremarkable





Objective





- Vital Signs


Vital signs: 


                                   Vital Signs











Temp  97.8 F   11/18/19 08:00


 


Pulse  75   11/18/19 08:00


 


Resp  18   11/18/19 08:00


 


BP  175/90   11/18/19 08:00


 


Pulse Ox  95   11/18/19 08:00








                                 Intake & Output











 11/17/19 11/18/19 11/18/19





 18:59 06:59 18:59


 


Intake Total 120  


 


Output Total  1400 


 


Balance 120 -1400 


 


Weight 87.997 kg 88 kg 


 


Intake:   


 


  Oral 120  


 


Output:   


 


  Urine  1400 


 


Other:   


 


  Voiding Method Toilet Toilet 


 


  # Voids 1 1 














- Exam





GENERAL: The patient is alert and oriented x3, not in any acute distress. Well 

developed, well nourished. 


HEENT: Pupils are round and equally reacting to light. EOMI. No scleral icterus.

No conjunctival pallor. Normocephalic, atraumatic. No pharyngeal erythema. No 

thyromegaly. 


CARDIOVASCULAR: S1 and S2 present. No murmurs, rubs, or gallops. 


-PULMONARY: Chest is clear to auscultation, scattered wheezing bilaterally


ABDOMEN: Soft, nontender, nondistended, normoactive bowel sounds. No palpable 

organomegaly. 


MUSCULOSKELETAL: No joint swelling or deformity. 


EXTREMITIES: No cyanosis, clubbing, or pedal edema. 


NEUROLOGICAL: Gross neurological examination did not reveal any focal deficits. 


SKIN: No rashes. no petechiae.





- Labs


CBC & Chem 7: 


                                 11/18/19 04:18





                                 11/18/19 04:18


Labs: 


                  Abnormal Lab Results - Last 24 Hours (Table)











  11/17/19 11/17/19 11/18/19 Range/Units





  11:26 11:26 04:18 


 


D-Dimer   0.90 H   (<0.60)  mg/L FEU


 


Glucose  130 H   146 H  (74-99)  mg/dL


 


AST  52 H    (14-36)  U/L


 


ALT  70 H    (9-52)  U/L


 


Triglycerides    156 H  (<150)  mg/dL


 


Cholesterol    254 H  (<200)  mg/dL


 


LDL Cholesterol, Calc    171 H  (0-99)  mg/dL














Assessment and Plan


Assessment: 





Chest pain, rule out acute coronary syndrome


Mild-to-moderate acute COPD exacerbation


Hypertension


GERD


Depression


Neuropathy on to monitor her right flank 6 years


Ex-smoker


Obesity


Plan: 





This is a pleasant 65 years old female who presents with chest pain and COPD.  

Cardiologist and following the patient and the plan and to add antihypertensive 

medication given her history of multiple ALLERGIES, also cardiologist getting h

er records.


Start prednisone as patient states she has taken that before and doxycycline to 

which patient agrees to take


Labs and medication were reviewed..  Continue same treatment.  Continue with sy

mptomatic treatment.  Resume home medication.  Monitor lytes and vitals.  DVT 

and GI prophylaxis.  Further recommendations of the clinical course of the 

patient


DVT prophylaxis: Subcutaneous heparin


GI Prophylaxis: Pepcid


PT/OT: Pending


Prognosis is guarded

## 2019-11-19 NOTE — CC
CARDIAC CATHETERIZATION REPORT



DATE OF SERVICE:

11/19/2019



PERFORMING PHYSICIAN:

Chris Palacios MD.



PROCEDURES PERFORMED:

1. Selective right and left coronary angiogram.

2. Successful stenting of the ostial right coronary artery using a 4.0 x 15 mm Xience

    drug-eluting stent with an excellent angiographic result and reduction of stenosis

    from 80% to 0%.



INDICATION:

This is a pleasant 65-year-old female patient with history of peripheral arterial

disease and prior aortobifem as well as hypertension and dyslipidemia who presented to

the hospital complaining of chest discomfort.  In January of 2019 she underwent a

stress test and echocardiogram at [facility] and both came in to

be unremarkable.  She continues to have chest discomfort, worsened with exertion and

also associated with sweating.  Because of that, a heart catheterization was advised.



APPROACH:

1. Right radial artery.

2. Right common femoral artery.



COMPLICATIONS:

None.



LEVEL OF SEDATION:

Moderate, with sedation length of 1 hour and 8 minutes.



PROCEDURE DESCRIPTION:

After obtaining informed consent, the patient was brought to the cardiac cath lab.

Initially I attempted accessing the right radial artery, but I was unsuccessful in

advancing the wire. Because of that, the right radial approach was aborted and we

decided to go from the groin.



The right common femoral artery was cannulated using micropuncture technique. The

micropuncture wire passed easily. Then I placed a 6-Chadian sheath 11 cm in the right

groin.  After that I did selective left and right coronary angiogram.  Selective left

coronary angiogram was performed using JL4 catheter.  Selective right coronary

angiogram was performed using Isak Kelley posterior.  After that I decided to

intervene on the right coronary artery.  Please see a separate paragraph for that.



SELECTIVE CORONARY ANGIOGRAM:

1. The left main appeared to be angiographically normal.  It bifurcates into a

    nondominant left circumflex and left anterior descending artery.

2. The left circumflex is a medium-caliber vessel and nondominant vessel.  It is

    angiographically normal.  In the midportion it gives rise to 2 small obtuse

    marginal branches; both appeared to be angiographically normal.

3. The LAD. The proximal LAD appeared to have a lesion in the range of 30% to 40%.

    This is by the bifurcation of first diagonal branch, which seems to be normal.  The

    mid LAD is normal and gives rise to a second diagonal branch which seems to be

    normal.  The LAD distally appeared to be normal.

4. The RCA.  The ostial RCA appeared to have a lesion in the range of 70% to 80%.

    Using 5-Chadian catheter, there was dampening in the waveform associated with EKG

    changes and chest discomfort.  The mid and distal RCA appeared to be normal.  The

    RCA is a large-caliber vessel. It is a dominant vessel.



PERCUTANEOUS CORONARY INTERVENTION OF THE RIGHT CORONARY ARTERY:

Anticoagulation was initiated using Angiomax.  Subsequently I did engage it using

Isak Kelley posterior guide.  I did wire it using a run-through wire.  After that I

did balloon angioplasty using a 3.0 x 10 mm AngioSculpt balloon which was inflated

under 12 atmospheres for 20 seconds.  After that I deployed a 4.0 X 15 mm Xience CELE

where the stent was positioned under fluoroscopic guidance and deployed under 20

atmospheres for 20 seconds.  The following angiogram showed excellent angiographic

results with good reflux of contrast and with SOBEIDA-3 flow.  The procedure was completed

without any complication.



CONCLUSION:

1. Severe disease involving the ostial RCA.

2. Successful stenting of the ostial RCA using a 4.0 x 15 mm Xience CELE with an

    excellent angiographic result.

3. Intermediate disease involving the proximal left anterior descending artery.



POST-PROCEDURE MANAGEMENT:

1. Dual anti-platelet therapy.

2. Risk factor modifications.

3. Follow up with the patient.





MMODL / IJN: [id number] / Job#: [id number]

## 2019-11-19 NOTE — P.PN
Subjective





This is a pleasant 65 years old female with past medical history of COPD, ex-

cigarette smoker, coronary artery disease, hypertension, liver disease, 

neuropathy on nerve stimulator on the right flank for 6 years, recently changed 

position by her doctor at [place].  She presents because of right-sided chest 

pain.  Also associated with some dyspnea and cough with white phlegm that is 

been going on since she had a procedure to switch her stimulator in the right 

flank on early October 2019.  Patient chest pain looks better than when she came

in however still bothering her as it came down from 8/10-5-60/10.  She still 

have difficulty catching her breath.


Patient have seen Dr. Fishman before and after pulmonary function test told her

she has COPD.  She is telling me she quit smoking about 15 years ago.


Patient has multiple medical ALLERGIES to medication including antihypertensive 

indication, cortisone, iodine, erythromycin, however she agrees to take 

prednisone as she is taken it before, and doxycycline.


Cardiologist but trying to get records from previous hospitalizations/office 

visit


Vitas looks stable however her blood pressure is 175/90.  Labs are unremarkable





11/19/2019


Patient is awake and alert.  This morning she started having another episode of 

right-sided chest pain going to the back and to the right shoulder and upper e

xtremity.  Patient is still complaining of from some dyspnea/tachypnea with, and

white phlegm since she had a procedure for her neurostimulator insertion in her 

right groin in 10/2019.  Patient says that she is ALLERGIC to albuterol, so 

breathing treatment was changed.  Ipratropium bronchodilator only.  Patient was 

started on a prednisone and doxycycline yesterday and she tolerated that well.  

She did not complain much of her pain in the right lower extremity today.  Vitas

looks stable.  Electrolytes and creatinine are within normal.  Patient is taking

ibuprofen with cone to start her on Norco and we will lower the dose of morphine

of from 4-2 mg.  Also last for physical therapy evaluation today.  At [facility]








Review of systems


CONSTITUTIONAL: No fever, no malaise, no fatigue. 


HEENT: No recent visual problems or hearing problems. Denied any sore throat. 


CARDIOVASCULAR: no palpitations, no syncope. 


PULMONARY: no hemoptysis. 


GASTROINTESTINAL: No diarrhea, no nausea, no vomiting, no abdominal pain. 

Normoactive bowel sounds. 


NEUROLOGICAL: No headaches, no weakness, no numbness. 


HEMATOLOGICAL: Denies any bleeding or petechiae. 


GENITOURINARY: Denies any burning micturition, frequency, or urgency. 


MUSCULOSKELETAL/RHEUMATOLOGICAL: Denies any joint pain, swelling, or any muscle 

pain. 


ENDOCRINE: Denies any polyuria or polydipsia.


                                        


                               Active Medications











Generic Name Dose Route Start Last Admin





  Trade Name Freq  PRN Reason Stop Dose Admin


 


Hydrocodone Bitart/Acetaminophen  1 each  11/19/19 09:09 





  Central 5-325  PO  





  Q6HR PRN  





  Pain  


 


Aspirin  325 mg  11/18/19 09:00  11/19/19 08:14





  Aspirin  PO   325 mg





  DAILY [facility]   Administration


 


Carvedilol  3.125 mg  11/18/19 17:30  11/19/19 06:27





  Coreg  PO   3.125 mg





  BID-W/MEALS [facility]   Administration


 


Cholecalciferol  1,000 unit  11/18/19 14:30  11/19/19 08:14





  Vitamin D3 (25 Mcg = 1000 Iu)  PO   1,000 unit





  DAILY [facility]   Administration


 


Doxycycline Monohydrate  100 mg  11/18/19 11:45  11/19/19 08:14





  Vibramycin  PO   100 mg





  BID [facility]   Administration


 


Ferrous Sulfate  325 mg  11/19/19 12:00 





  Feosol  PO  





  DAILY@1200 [facility]  


 


Guaifenesin/Dextromethorphan  10 ml  11/19/19 09:05 





  Robitussin Dm  PO  





  Q6H PRN  





  Cough  


 


Heparin Sodium (Porcine)  5,000 unit  11/18/19 14:00  11/19/19 08:13





  Heparin  SQ   5,000 unit





  Q12HR [facility]   Administration


 


Hydrochlorothiazide  25 mg  11/18/19 14:30  11/19/19 08:14





  Hydrodiuril  PO   25 mg





  DAILY [facility]   Administration


 


Ipratropium Bromide  0.5 mg  11/19/19 09:05 





  Atrovent Nebulized  INHALATION  





  RT-Q4H PRN  





  Shortness Of Breath Or Wheezing  


 


Loratadine  10 mg  11/18/19 14:30  11/19/19 08:14





  Claritin  PO   10 mg





  DAILY [facility]   Administration


 


Morphine Sulfate  2 mg  11/19/19 09:09 





  Morphine Sulfate (Inj)  IVP  





  Q3HR PRN  





  Pain  


 


Nitroglycerin  0.4 mg  11/17/19 14:53 





  Nitrostat  SUBLINGUAL  





  Q5M PRN  





  Chest Pain  


 


Nitroglycerin  1 inch  11/17/19 18:00  11/19/19 06:27





  Nitro-Bid Oint  TOPICAL   1 inch





  Q6HR [facility]   Administration


 


Pantoprazole Sodium  40 mg  11/18/19 14:30  11/19/19 06:27





  Protonix  PO   40 mg





  AC-BRKFST [facility]   Administration


 


Prednisone  50 mg  11/18/19 11:45  11/19/19 08:15





  PO   50 mg





  DAILY [facility]   Administration


 


Sertraline HCl  100 mg  11/18/19 14:30  11/19/19 08:14





  Zoloft  PO   100 mg





  DAILY [facility]   Administration


 


Sodium Chloride  10 ml  11/17/19 21:00  11/19/19 08:15





  Saline Flush  IV   10 ml





  BID [facility]   Administration














Objective





- Vital Signs


Vital signs: 


                                   Vital Signs











Temp  97.7 F   11/19/19 07:49


 


Pulse  73   11/19/19 07:49


 


Resp  22   11/19/19 07:49


 


BP  165/79   11/19/19 07:49


 


Pulse Ox  97   11/19/19 07:49








                                 Intake & Output











 11/18/19 11/19/19 11/19/19





 18:59 06:59 18:59


 


Intake Total  222 200


 


Balance  222 200


 


Weight  88.7 kg 


 


Intake:   


 


  Oral  222 200


 


Other:   


 


  Voiding Method  Toilet 


 


  # Voids  1 














- Exam





GENERAL: The patient is alert and oriented x3, not in any acute distress. Well 

developed, well nourished. 


HEENT: Pupils are round and equally reacting to light. EOMI. No scleral icterus.

No conjunctival pallor. Normocephalic, atraumatic. No pharyngeal erythema. No 

thyromegaly. 


CARDIOVASCULAR: S1 and S2 present. No murmurs, rubs, or gallops. 


-PULMONARY: Chest is clear to auscultation, scattered wheezing bilaterally


ABDOMEN: Soft, nontender, nondistended, normoactive bowel sounds. No palpable 

organomegaly. 


MUSCULOSKELETAL: No joint swelling or deformity. 


EXTREMITIES: No cyanosis, clubbing, or pedal edema. 


NEUROLOGICAL: Gross neurological examination did not reveal any focal deficits. 


SKIN: No rashes. no petechiae.





- Labs


CBC & Chem 7: 


                                 11/18/19 04:18





                                 11/19/19 05:23


Labs: 


                  Abnormal Lab Results - Last 24 Hours (Table)











  11/19/19 Range/Units





  05:23 


 


Carbon Dioxide  33 H  (22-30)  mmol/L


 


BUN  21 H  (7-17)  mg/dL


 


Glucose  155 H  (74-99)  mg/dL














Assessment and Plan


Assessment: 





Chest pain, rule out acute coronary syndrome


Mild-to-moderate acute COPD exacerbation


Multiple drug ALLERGY which limits her treatment options


Hypertension


GERD


Depression


Neuropathy on to monitor her right flank 6 years


Ex-smoker


Obesity


Plan: 





This is a pleasant 65 years old female who presents with chest pain and COPD.  

Cardiologist and following the patient and the plan and to add antihypertensive 

medication given her history of multiple ALLERGIES, also cardiologist getting 

her records.


Continue with prednisone and doxycycline .  As for physical therapy evaluation. 

Follow up with cardiologist recommendation


Labs and medication were reviewed..  Continue same treatment.  Continue with 

symptomatic treatment.  Resume home medication.  Monitor lytes and vitals.  DVT 

and GI prophylaxis.  Further recommendations of the clinical course of the 

patient


DVT prophylaxis: Subcutaneous heparin


GI Prophylaxis: Pepcid


PT/OT: Pending


Prognosis is guarded

## 2019-11-19 NOTE — P.PN
Subjective


Progress Note Date: 11/19/19


Principal diagnosis: 





Chest pain





This is a very pleasant 65-year-old female patient with a past medical history 

significant for hypertension and dyslipidemia and history of peripheral arterial

disease with prior aortobifem bypass surgery was admitted to the hospital with a

chest discomfort.  She was ruled out for acute coronary event.  She was ruled 

out for PE as well.





She was seen this morning, 11/19/2019.  Unfortunately she continues to have a 

chest discomfort mostly with exertion.  Sometimes with a resting.  She continues

to have intermittent episodes of sweating as well as a she stated that the chest

discomfort radiated to her right arm as well as to the neck.  Giving the ongoing

chest discomfort, I did recommend proceeding with coronary angiogram to be 

performed from right radial approach.





Objective





- Vital Signs


Vital signs: 


                                   Vital Signs











Temp  97.7 F   11/19/19 07:49


 


Pulse  73   11/19/19 07:49


 


Resp  22   11/19/19 07:49


 


BP  165/79   11/19/19 07:49


 


Pulse Ox  97   11/19/19 07:49








                                 Intake & Output











 11/18/19 11/19/19 11/19/19





 18:59 06:59 18:59


 


Intake Total  222 200


 


Balance  222 200


 


Weight  88.7 kg 


 


Intake:   


 


  Oral  222 200


 


Other:   


 


  Voiding Method  Toilet 


 


  # Voids  1 














- Constitutional


General appearance: Present: no acute distress





- Respiratory


Respiratory: bilateral: CTA





- Cardiovascular


Rhythm: regular


Heart sounds: normal: S1, S2





- Labs


CBC & Chem 7: 


                                 11/18/19 04:18





                                 11/19/19 05:23


Labs: 


                  Abnormal Lab Results - Last 24 Hours (Table)











  11/19/19 Range/Units





  05:23 


 


Carbon Dioxide  33 H  (22-30)  mmol/L


 


BUN  21 H  (7-17)  mg/dL


 


Glucose  155 H  (74-99)  mg/dL














Assessment and Plan


Assessment: 





Assessment


#1 chest discomfort


#2 peripheral arterial disease


#3 hypertension





Plan


#1 acute coronary syndrome was ruled out


#2 PE was ruled out


#3 increase the dose of carvedilol to control the blood pressure


#4 proceed with coronary angiogram

## 2019-11-20 NOTE — P.DS
Providers


Date of admission: 


11/19/19 08:49





Attending physician: 


Pearl Sahu





Consults: 





                                        





11/17/19 14:53


Consult Physician Urgent 


   Consulting Provider: Alex Dillard


   Consult Reason/Comments: cp


   Do you want consulting provider notified?: Yes





11/19/19 18:12


Consult Physician Routine 


   Consulting Provider: [facility]


   Consult Reason/Comments: Post Interventional patient


   Do you want consulting provider notified?: Already Contacted











Primary care physician: 


[facility]





Hospital Course: 





Diagnoses:


acute coronary syndrome, mostly unstable angina.  Status post cardiac cath and 

stent placement of the RCA


Mild-to-moderate acute COPD exacerbation, improving


Multiple drug ALLERGY which limits her treatment options


Hypertension


GERD


Depression


Neuropathy on to monitor her right flank 6 years


Ex-smoker


Obesity








Hospital course :


 This is a pleasant 65 years old female with past medical history of COPD, ex-

cigarette smoker, coronary artery disease, hypertension, liver disease, neuro

comfort on nerve stimulator on the right flank for 6 years, recently changed 

position by her doctor at [place].  She presents because of right-sided chest 

pain.  Also associated with some dyspnea and cough with white phlegm that is 

been going on since she had a procedure to switch her stimulator in the right 

flank on early October 2019.  Patient kept having recurrent severe right-sided 

chest pain, cardiologist evaluated the patient and eventually patient had 

cardiac cath on 11/19/2019   Showing severe disease involving the ostial RCA, 

status post successful stenting of the ostial RCA also has intermediate disease 

involving the proximal anterior descending artery.  Patient will be discharged 

on dual antiplatelet therapy.  After the procedure patient chest pain is 

completely resolved and patient was satisfied with the results and smiling.  Her

dyspnea and coughing are also improved with the treatment of oral prednisone and

doxycycline.  Patient returned to her baseline with no other new symptoms.  No 

dyspnea.  No change in urine or bowel habits.  No headache.  She is tolerating 

diet well.  No fever.


Patient was cleared for discharge by cartilage team


Problems and management plan were discussed with the patient and he verbalized 

understanding and acceptance


Patient was found stable and can be discharged home however he needs follow-up 

as an outpatient. Patient was instructed to follow up with PCP within one week 

and patient agrees, appointment is made for the pt with her pcp and cardiology 

office will call pt for appointment and pt agrees.





Gen: patient is a AAOx3, no distress


CVS: S1-S2, RRR, no murmur


Lungs: B/L CTA, no wheezing


Abdomen: soft, no distention, no tenderness, positive bowel sounds


Extremity: no leg edema or induration





Time spent more than 35 minutes








Patient Condition at Discharge: Stable





Plan - Discharge Summary


Discharge Rx Participant: No


New Discharge Prescriptions: 


New


   Aspirin 81 mg PO DAILY #30 chew


   Carvedilol [Coreg] 6.25 mg PO BID-W/MEALS #60 tab


   Atorvastatin [Lipitor] 80 mg PO DAILY #30 tab


   Nitroglycerin Sl Tabs [Nitrostat] 0.4 mg SUBLINGUAL Q5M PRN #25 tab


     PRN Reason: Chest Pain


   Clopidogrel [Plavix] 75 mg PO DAILY #30 tab


   Losartan [Cozaar] 25 mg PO DAILY #30 tab


   predniSONE 10 mg PO AS DIRECTED #18 tab





Continue


   Sertraline [Zoloft] 100 mg PO DAILY


   Ferrous Sulfate [Iron (65 MG Elemental)] 325 mg PO DAILY


   Cholecalciferol [Vitamin D3 (25 Mcg = 1000 Iu)] 1,000 unit PO DAILY


   Pantoprazole Sodium [Protonix] 40 mg PO DAILY


   Cetirizine HCl [Zyrtec] 10 mg PO DAILY





Discontinued


   Hydrochlorothiazide [Hydrodiuril] 25 mg PO DAILY


Discharge Medication List





Cetirizine HCl [Zyrtec] 10 mg PO DAILY 11/17/19 [History]


Cholecalciferol [Vitamin D3 (25 Mcg = 1000 Iu)] 1,000 unit PO DAILY 11/17/19 

[History]


Ferrous Sulfate [Iron (65 MG Elemental)] 325 mg PO DAILY 11/17/19 [History]


Pantoprazole Sodium [Protonix] 40 mg PO DAILY 11/17/19 [History]


Sertraline [Zoloft] 100 mg PO DAILY 11/17/19 [History]


Aspirin 81 mg PO DAILY #30 chew 11/20/19 [Rx]


Atorvastatin [Lipitor] 80 mg PO DAILY #30 tab 11/20/19 [Rx]


Carvedilol [Coreg] 6.25 mg PO BID-W/MEALS #60 tab 11/20/19 [Rx]


Clopidogrel [Plavix] 75 mg PO DAILY #30 tab 11/20/19 [Rx]


Losartan [Cozaar] 25 mg PO DAILY #30 tab 11/20/19 [Rx]


Nitroglycerin Sl Tabs [Nitrostat] 0.4 mg SUBLINGUAL Q5M PRN #25 tab 11/20/19 

[Rx]


predniSONE 10 mg PO AS DIRECTED #18 tab 11/20/19 [Rx]








Follow up Appointment(s)/Referral(s): 


Chris Palacios MD [STAFF PHYSICIAN] - 1 Week (Office will call you for follow up 

appointment)


[facility],Clinic [Primary Care Provider] - 11/27/19 1:00 pm (Wednesday)


Patient Instructions/Handouts:  *Surgery [facility] - After Heart Catheterization - 

Ambulatory Care Instructions, Left Heart Catheterization (DC), How to Stop 

Smoking (DC), Heart Healthy Diet (DC)


Activity/Diet/Wound Care/Special Instructions: 


Cardiac diet





Activity is limited till you see your doctor 


Discharge Disposition: HOME SELF-CARE

## 2019-11-20 NOTE — P.PN
Subjective


Progress Note Date: 11/20/19





This is a pleasant 65-year-old female patient with a past medical history 

significant for hypertension and history of peripheral arterial disease where 

the patient underwent in the past and aortobifem bypass presented to the 

hospital complaining of chest discomfort.  The patient was in her usual state of

health until yesterday when she was making bread at home and started 

experiencing chest discomfort, she describes mainly over the right side of the 

chest, with some radiation to the arm, and she described the discomfort as sharp

kind of discomfort.  No associated symptoms of shortness of breath, dizziness, 

sweating, heart racing or fluttering, or syncope.  The chest discomfort 

continues and because of that she decided to come to the emergency room.  The 

EKG showed sinus rhythm without any significant ST or T-wave abnormalities.  

Continue to have symptoms of chest discomfort was taken to the cardiac 

catheterization lab yesterday where she underwent successful stenting of the 

ostial right coronary artery.  Let pressure 136/78 with a heart rate in the 60s,

93% on room air.  Sodium 138, potassium 3.8, BUN 24 and creatinine 0.8.  EKG 

from this morning showed a normal sinus rhythm with no changes from post-PCI.





Objective





- Vital Signs


Vital signs: 


                                   Vital Signs











Temp  97.5 F L  11/20/19 02:24


 


Pulse  66   11/20/19 05:57


 


Resp  18   11/20/19 02:24


 


BP  137/79   11/20/19 05:57


 


Pulse Ox  93 L  11/20/19 02:24








                                 Intake & Output











 11/19/19 11/20/19 11/20/19





 18:59 06:59 18:59


 


Intake Total 682 420 


 


Output Total   0


 


Balance 682 420 0


 


Weight  89.3 kg 


 


Intake:   


 


    


 


  Intake, IV Titration  300 





  Amount   


 


    Sodium Chloride 0.9% 1,  300 





    000 ml @ 75 mls/hr IV .   





    [id number] [facility] Rx#:[id number]   


 


  Oral 200 120 


 


Output:   


 


  Urine   0


 


Other:   


 


  Voiding Method Toilet Toilet 


 


  # Voids 1 1 














- Exam





PHYSICAL EXAMINATION: 





GENERAL: 65-year-old  female in no acute distress at the time of my 

examination





HEENT: Head is atraumatic, normocephalic.  Pupils equal, round.  Sclera 

anicteric. Conjunctiva are clear.  Mucous membranes of the mouth are moist.  

Neck is supple.  There is no elevated jugular venous pressure.  No carotid  br

uit is heard.





HEART EXAMINATION: Heart S1, S2 normal.  No murmur or gallop heard.





CHEST EXAMINATION: Lungs are clear to auscultation and precussion. No chest wall

tenderness is noted on palpation or with deep breathing.





ABDOMEN:  Soft, obese, nontender. Bowel sounds are heard. No organomegaly noted.


 


EXTREMITIES: 1+ peripheral pulses with no evidence of peripheral edema and no 

calf tenderness noted.  Right radial site is clean and dry, good distal pulse.  

Groin is soft, there is areas of ecchymosis, no hematoma.





NEUROLOGIC patient is awake, alert and oriented 3 


 


.


 








- Labs


CBC & Chem 7: 


                                 11/18/19 04:18





                                 11/20/19 05:20


Labs: 


                  Abnormal Lab Results - Last 24 Hours (Table)











  11/20/19 Range/Units





  05:20 


 


BUN  24 H  (7-17)  mg/dL


 


Glucose  157 H  (74-99)  mg/dL














Assessment and Plan


Plan: 


Assessment and plan


#1 chest pain, status post angioplasty and stenting of the ostial RCA


#2 hypertension


#3 hyperlipidemia


#4 PAD with prior aortofemoral bypass


#5 prior history of smoking





Plan


Patient may be discharged home today from cardiology's perspective, we will 

continue aspirin 81 mg daily, Lipitor 80 mg daily, Coreg 6.25 mg twice a day, 

Plavix 75mg PO daily, Losartan 25mg daily, and sublingual nitroglycerin as 

needed for chest pain.  Follow-up appointment in the office with Dr. Aranda.





DNP note has been reviewed, I agree with a documented findings and plan of care.

 Patient was seen and examined.

## 2019-11-21 NOTE — ECHOF
Referral Reason:chest pain



MEASUREMENTS

--------

HEIGHT: 180.3 cm

WEIGHT: 88.9 kg

BP: 

RVIDd:   2.0 cm     (< 3.3)

IVSd:   1.0 cm     (0.6 - 1.1)

LVIDd:   4.3 cm     (3.9 - 5.3)

LVPWd:   1.3 cm     (0.6 - 1.1)

IVSs:   1.6 cm

LVIDs:   2.0 cm

LVPWs:   1.8 cm

LAESV Index (A-L):   19.40 ml/m

Ao Diam:   3.0 cm     (2.0 - 3.7)

AV Cusp:   1.8 cm     (1.5 - 2.6)

LA Diam:   2.7 cm     (2.7 - 3.8)

MV EXCURSION:   11.800 mm     (> 18.000)

MV EF SLOPE:   88 mm/s     (70 - 150)

EPSS:   0.4 cm

MV E Graeme:   0.84 m/s

MV DecT:   177 ms

MV A Graeme:   0.76 m/s

MV E/A Ratio:   1.12 

RAP:   5.00 mmHg

RVSP:   14.00 mmHg

TAPSE:   27.66 mm







FINDINGS

--------

Sinus rhythm.

This was a technically adequate study.

The left ventricular size is normal.   Left ventricular wall thickness is normal.   Overall left vent
ricular systolic function is normal with, an EF between 55 - 60 %.   The diastolic filling pattern is
 normal for the age of the patient 8.36.

The right ventricle is normal in size.   The right ventricular systolic function is normal.

The left atrial size is normal.    Normal LA  size by volume 22+/-6 ml/m2.

The right atrial size is normal.

Interatrial and interventricular septum intact.

The aortic valve is trileaflet and appears structurally normal.

The mitral valve is normal.   The mitral valve leaflets are mildly thickened.   Mild mitral regurgita
tion is present.

The tricuspid valve appears structurally normal.   Trace tricuspid regurgitation present.   Right grant
tricular systolic pressure is normal at < 35 mmHg.

There is no pulmonic regurgitation present.

The aortic root size is normal.

IVC Not well visulized.

There is no pericardial effusion.



CONCLUSIONS

--------

1. Sinus rhythm.

2. This was a technically adequate study.

3. The left ventricular size is normal.

4. Left ventricular wall thickness is normal.

5. Overall left ventricular systolic function is normal with, an EF between 55 - 60 %.

6. The diastolic filling pattern is normal for the age of the patient 8.36

7. The right ventricle is normal in size.

8. The right ventricular systolic function is normal.

9. The left atrial size is normal.

10. Normal LA size by volume 22+/-6 ml/m2.

11. The right atrial size is normal.

12. Interatrial and interventricular septum intact.

13. The aortic valve is trileaflet and appears structurally normal.

14. The mitral valve is normal.

15. The mitral valve leaflets are mildly thickened.

16. Mild mitral regurgitation is present.

17. The tricuspid valve appears structurally normal.

18. Trace tricuspid regurgitation present.

19. Right ventricular systolic pressure is normal at < 35 mmHg.

20. There is no pulmonic regurgitation present.

21. The aortic root size is normal.

22. IVC Not well visulized.

23. There is no pericardial effusion.





SONOGRAPHER: Sarah Mittla RDCS

## 2019-11-22 NOTE — CT
EXAMINATION TYPE: CT angio chest

 

DATE OF EXAM: 11/22/2019

 

COMPARISON: CTA chest dated 11/17/2019

 

HISTORY: Chest pain

 

CT DLP: 394.10 mGycm. Automated Exposure Control for Dose Reduction was Utilized.

 

 

CONTRAST: 

CTA scan of the thorax is performed without and with IV Contrast, patient injected with 100 ml mL of 
Isovue 370, pulmonary embolism protocol.  MIP Images are created on CT scanner and reviewed.

 

FINDINGS:

 

LUNGS: Advanced emphysematous changes of the lungs. Mild biapical pleural parenchymal scarring. The l
ungs are grossly clear, there is no concerning parenchymal mass or nodule identified.   There is no p
leural effusion or pneumothorax seen.  The tracheobronchial tree is patent. Pectus excavatum deformit
y.

 

MEDIASTINUM: There is satisfactory enhancement of the pulmonary artery and its branches, there is no 
CT evidence for pulmonary embolism.  There are no greater than 1 cm hilar or mediastinal lymph nodes.
   No cardiomegaly or pericardial effusion is seen. Moderate coronary calcifications.

 

OTHER: There is a similar appearing subcutaneous nodule in comparison to the prior CT of 8/21/2019 wi
thin the ventral abdominal soft tissues. This measures 7 mm and is seen 3 cm deep to the skin surface
. Moderate multilevel degenerative changes of the spine. Severe hepatic steatosis is seen limiting ev
aluation for hepatic masses. 

 

IMPRESSION: 

1. No evidence of pulmonary embolus.

2. Advanced emphysematous change of the lungs with no new focal consolidation.

3. Severe hepatic steatosis.

## 2019-11-22 NOTE — ED
Chest Pain HPI





- General


Chief Complaint: Chest Pain


Stated Complaint: Chest pain


Time Seen by Provider: 11/22/19 11:05


Source: patient


Mode of arrival: ambulatory


Limitations: no limitations





- History of Present Illness


Initial Comments: 





Patient is a 65-year-old female with past no history of coronary disease who 

presents emergency room with reported chest pain.  She was recently hospitalized

this past week.  She has been having persistent chest pain and was cathed by Dr. Palacios on Tuesday.  He did find an 85% occlusion in her right coronary artery for 

which he stented.  She states that she had great improvement in her pain after 

the cath and was sent home.  She was started on 7 medications and taking them 

all as directed.  She states that she awoke from sleep this morning.  She had a 

ripping, tearing sensation in the right side of her chest.  It went straight 

through to her back.  She did take 3 nitro at home without improvement in her 

pain.  The pain goes into her right arm as well.  No numbness or tingling.  No 

weakness in the upper extremities.  Denies any pain in her lower extremities.  

No abdominal pain.  No associated nausea, vomiting or diaphoresis.  No cough, 

fevers or chills.  When the nitro did not relieve the symptoms she presented to 

her for further evaluation.  She has a remote history of synthetic aorta repair 

13 years ago by Dr. Valenzuela.  His DVT or PE.  No lower extremity edema.  No 

calf pain.  There are no alleviating, precipitating or modifying factors





- Related Data


                                Home Medications











 Medication  Instructions  Recorded  Confirmed


 


Cetirizine HCl [Zyrtec] 10 mg PO DAILY 11/17/19 11/22/19


 


Cholecalciferol [Vitamin D3 (25 1,000 unit PO DAILY 11/17/19 11/22/19





Mcg = 1000 Iu)]   


 


Ferrous Sulfate [Iron (65  mg PO DAILY 11/17/19 11/22/19





Elemental)]   


 


Pantoprazole Sodium [Protonix] 40 mg PO DAILY 11/17/19 11/22/19


 


Sertraline [Zoloft] 100 mg PO DAILY 11/17/19 11/22/19


 


predniSONE See Taper PO AS DIRECTED 11/22/19 11/22/19








                                  Previous Rx's











 Medication  Instructions  Recorded


 


Aspirin 81 mg PO DAILY #30 chew 11/20/19


 


Atorvastatin [Lipitor] 80 mg PO DAILY #30 tab 11/20/19


 


Carvedilol [Coreg] 6.25 mg PO BID-W/MEALS #60 tab 11/20/19


 


Clopidogrel [Plavix] 75 mg PO DAILY #30 tab 11/20/19


 


Losartan [Cozaar] 25 mg PO DAILY #30 tab 11/20/19


 


Nitroglycerin Sl Tabs [Nitrostat] 0.4 mg SUBLINGUAL Q5M PRN #25 tab 11/20/19


 


Acetaminophen Tab [Tylenol Tab] 500 mg PO Q6H PRN #30 tablet 11/23/19


 


HYDROcodone/APAP 5-325MG [Norco 1 each PO Q6HR PRN #10 tab 11/25/19





5-325]  


 


Sulfamethox-Tmp 800-160Mg [Bactrim 1 each PO BID #6 tab 11/25/19





-160 mg]  











                                    Allergies











Allergy/AdvReac Type Severity Reaction Status Date / Time


 


albuterol Allergy  Rash/Hives Verified 11/22/19 13:25


 


cortisone Allergy  Swelling Verified 11/22/19 13:25


 


erythromycin base Allergy  Rash/Hives Verified 11/22/19 13:25


 


fentanyl [From Duragesic] Allergy  Unknown Verified 11/22/19 13:25





   Childhood  


 


iodine Allergy  Anaphylaxis Verified 11/22/19 13:25


 


lisinopril Allergy  Swelling Verified 11/22/19 13:25


 


modafinil Allergy  Unknown Verified 11/22/19 13:25


 


nystatin Allergy  Rash/Hives Verified 11/22/19 13:25


 


paroxetine [From Paxil] Allergy  Unknown Verified 11/22/19 13:25


 


procaine [From Novocain] Allergy  Swelling Verified 11/22/19 13:25


 


vancomycin Allergy  Rash/Hives Verified 11/22/19 13:25


 


amlodipine AdvReac  Nausea & Verified 11/22/19 13:25





   Vomiting &  





   Diarrhea  


 


aspirin AdvReac  Unknown Verified 11/22/19 13:25


 


isosorbide AdvReac  Nausea & Verified 11/22/19 13:25





   Vomiting &  





   Diarrhea  


 


labetalol AdvReac  Nausea & Verified 11/22/19 13:25





   Vomiting &  





   Diarrhea  


 


metoprolol AdvReac  Diarrhea Verified 11/22/19 13:25


 


morphine AdvReac  Diarrhea Verified 11/22/19 13:25


 


oxcarbazepine AdvReac  Unknown Verified 11/22/19 13:25





[From Trileptal]     














Review of Systems


ROS Statement: 


Those systems with pertinent positive or pertinent negative responses have been 

documented in the HPI.





ROS Other: All systems not noted in ROS Statement are negative.





EKG Findings





- EKG Comments:


EKG Findings:: EKG demonstrates a normal sinus rhythm with a ventricular rate of

66.  VA interval 150.  QRS E4.  QTC of 46.  There are no acute ST segment 

elevations or depressions concerning for ischemic changes.  Repeat EKG was 

performed at 1320 as the patient reported that her chest pain became 10 out of 

10.  Demonstrates normal sinus rhythm with ventricular rate of 62.  VA interval 

144.  QRS 90.  QTC of 432.  No significant elevations or depressions concerning 

for ischemic changes





Past Medical History


Past Medical History: Coronary Artery Disease (CAD), GERD/Reflux, Hypertension, 

Liver Disease


Additional Past Medical History / Comment(s): NERVE DAMAGE AND PAIN, anemia


History of Any Multi-Drug Resistant Organisms: None Reported


Past Surgical History: Cholecystectomy, Hysterectomy, Orthopedic Surgery, 

Tonsillectomy


Additional Past Surgical History / Comment(s): AORTIC SURGERY, pt has a pain 

stimulator and it has been moved, B heels, L femoral endartectomy


Past Anesthesia/Blood Transfusion Reactions: No Reported Reaction


Past Psychological History: Depression


Smoking Status: Former smoker


Past Alcohol Use History: None Reported


Past Drug Use History: None Reported





- Past Family History


  ** Mother


Family Medical History: No Reported History





  ** Father


Family Medical History: Coronary Artery Disease (CAD)





General Exam


Limitations: no limitations


General appearance: alert, in no apparent distress


Head exam: Present: atraumatic, normocephalic, normal inspection


Eye exam: Present: normal appearance, PERRL, EOMI.  Absent: scleral icterus, 

conjunctival injection, periorbital swelling


ENT exam: Present: normal exam, mucous membranes moist


Neck exam: Present: normal inspection.  Absent: tenderness, meningismus, 

lymphadenopathy


Respiratory exam: Present: normal lung sounds bilaterally.  Absent: respiratory 

distress, wheezes, rales, rhonchi, stridor


Cardiovascular Exam: Present: regular rate, normal rhythm, normal heart sounds, 

other (equal upper extremity pulses).  Absent: systolic murmur, diastolic 

murmur, rubs, gallop, clicks


GI/Abdominal exam: Present: soft, normal bowel sounds.  Absent: distended, 

tenderness, guarding, rebound, rigid


Extremities exam: Present: normal inspection, full ROM, normal capillary refill.

 Absent: tenderness, pedal edema, joint swelling, calf tenderness


Back exam: Present: normal inspection


Neurological exam: Present: alert, oriented X3, CN II-XII intact


Psychiatric exam: Present: normal affect, normal mood


Skin exam: Present: warm, dry, intact, normal color.  Absent: rash





Course


                                   Vital Signs











  11/22/19 11/22/19 11/22/19





  11:04 11:30 12:00


 


Temperature 97.6 F  


 


Pulse Rate 70 60 65


 


Respiratory 20 9 L 12





Rate   


 


Blood Pressure 160/84 178/91 160/87


 


O2 Sat by Pulse 97 98 97





Oximetry   














  11/22/19 11/22/19





  12:30 14:30


 


Temperature  


 


Pulse Rate 64 67


 


Respiratory 23 16





Rate  


 


Blood Pressure 151/84 159/77


 


O2 Sat by Pulse 96 96





Oximetry  














Chest Pain MDM





- MDM





Upon arrival the patient was placed into room 3.  A thorough history and physi

giancarlo exam is performed.  A 12-lead EKG was performed which demonstrated no 

abnormal findings.  Did recommend laboratory studies.  CBC, CMP and coags are 

unremarkable.  First troponin is negative.  The patient recently had a CT of her

chest on the 17th.  I did recommend sending the patient over for a CT dissection

protocol as the patient does have a history of aortic repair.  I discussed this 

with  sheet recommended that the patient have a CT PE study because of her 

history of respiratory insufficiency upon last hospitalization.  CT a of the 

chest demonstrates no evidence of pulmonary embolism.  Advanced emphysematous 

changes of the lungs with no new focal consolidation.  The patient will be 

admitted to the hospital for cardiology consult.  She was given 4 of morphine 

for pain control and iodine prep for her study.  Patient remained in stable 

condition and was transported to the floor





Disposition


Clinical Impression: 


 Chest pain





Disposition: ADMITTED AS IP TO THIS HOSP


Condition: Stable


Is patient prescribed a controlled substance at d/c from ED?: No


Decision to Admit Reason: Admit from EC


Decision Date: 11/22/19


Decision Time: 14:08

## 2019-11-22 NOTE — XR
EXAMINATION TYPE: XR chest 2V

 

DATE OF EXAM: 11/22/2019

 

COMPARISON: Prior chest x-ray and chest CT 11/17/2019

 

HISTORY: Chest pain

 

TECHNIQUE:  Frontal and lateral views of the chest are obtained.

 

FINDINGS:  There are leads present along the thoracic spinal canal as on prior. No evident airspace d
isease, pneumothorax, or pleural effusion. Cardiomediastinal silhouette, pulmonary vascularity and hi
la are within normal limits. There are overlying cardiac leads. Prominent lung volumes compatible wit
h patient's underlying emphysema.

 

IMPRESSION:  No acute cardiopulmonary process.

## 2019-11-23 NOTE — HP
HISTORY AND PHYSICAL



DATE OF SERVICE:

11/22/2019



CHIEF COMPLAINT:

Chest pain.



HISTORY OF PRESENT ILLNESS:

This 65-year-old woman with a past medical history of CAD, history of GERD,

hypertension, history of liver disease, history of nerve damage and pains, 
cholecystomy

being followed by Dr. Lester in the outpatient setting has had persistent 
right-

sided chest pains.  The patient underwent cardiac catheterization and stenting 
of the

RCA by Dr. Palacios recently.  Currently the patient is still complaining of pain on
the

center part of chest, radiating to the right side and also the left side and the
pain

was showing ripping and tearing sensations and also went straight to the back 
and the

patient took some 3 nitros without any improvement and there is no numbness, 
tingling.

No associated aggravating or relieving factors.  Patient came to [facility]

and was admitted for further evaluation and treatment.  Initial evaluation 
showed

negative troponins and EKG.  Otherwise, cardiology evaluation in progress at 
this time.

There is no history of fever, rigors or chills.



PAST MEDICAL HISTORY:

CAD, recent stenting, GERD, hypertension, liver disease, history of 
cholecystectomy.



MEDICATIONS:

Prior to admission include home medications are:

1. Prednisone taper.

2. Zoloft 100 mg p.o. daily.

3. Protonix 40 mg daily.

4. Nitrostat 0.4 sublingual p.r.n.

5. Cozaar 25 mg p.o. daily.

6. Iron sulfate 320 mg p.o. daily.

7. Plavix 75 mg p.o. daily.

8. Vitamin D3 1000 daily.

9. Zyrtec 10 mg p.o. daily.

10.Coreg 6.25 mg b.i.d. with meals.

11.Lipitor 80 mg p.o. daily.

12.Aspirin 81 mg p.o. daily.



ALLERGIES:

MULTIPLE ALLERGIES, ALBUTEROL, CORTISONE, ERYTHROMYCIN, ID, LISINOPRIL, 
MODAFINIL,

NYSTATIN, PAXIL, AMLODIPINE, ASPIRIN, ISOSORBIDE,  METOPROLOL, MORPHINE,

OXCARBAZEPINE.



SOCIAL HISTORY:

Previous history of smoking.  No history of current smoking. No alcohol intake.



REVIEW OF SYSTEMS:

ENT: No diminished hearing.  No diminished vision.

CARDIOVASCULAR as mentioned earlier.

RESPIRATORY: As mentioned earlier.

GI no nausea or vomiting.

 no dysuria or hematuria.

NERVOUS SYSTEM: No numbness or weakness.

ALLERGY/IMMUNOLOGY: No asthma or hayfever.

MUSCULOSKELETAL as mentioned earlier.

HEMATOLOGY/ONCOLOGY: No history of anemia.

ENDOCRINE:  No history of diabetes or hypothyroidism.

CONSTITUTIONAL: As mentioned earlier.

DERMATOLOGY: Negative.

RHEUMATOLOGY negative.

PSYCHIATRY as mentioned earlier.



PHYSICAL EXAMINATION:

Alert and oriented times three.  Pulse 56, blood pressure 140/77, respirations 
16,

temperature 97.9, pulse ox 97% on room air.

HEENT: Conjunctivae normal.  Oral mucosa moist.

NECK is no jugular venous distention.  No carotid bruit. No lymph node 
enlargement.

CARDIOVASCULAR SYSTEM:  S1, S2 muffled. No S3, no S4.

RESPIRATORY: Breath sounds diminished in the bases.  No rhonchi.  No crackles.

ABDOMEN:  Soft, nontender.  No mass palpable.

LEGS:  No edema. No swelling.

NERVOUS SYSTEM: Higher functions as mentioned earlier. Moves all 4 limbs.  No 
focal

motor or sensory deficits.

LYMPHATICS: No lymph nodes palpable in the neck, axillae or groin.

SKIN:  No ulcer, no rashes and no bleeding.

JOINTS:  No active deforming arthropathy.



LABS:

CBC within normal limits and BUN is 23 and creatinine 0.87, glucose 137, ALT is 
64.



ASSESSMENT:

1. Chest pain possible unstable angina.

2. History of coronary artery disease, recent stent to the RCA.

3. Increased elevated ALT 64.

4. History of gastroesophageal reflux disease.

5. Hypertension.

6. History of liver disease.

7. History of cholecystectomy.

8. History of degenerative joint disease.

9. History of tonsillectomy.

10.History of pain stimulator and removal.

11.History of left femoral endarterectomy.

12.History of depression.

13.Remote history of nicotine dependence.

14.Obesity with body mass of 37.9.

15.Multiple allergies.



RECOMMENDATIONS AND DISCUSSION:

In this 65-year-old woman who presented with multiple medical issues, we will 
monitor

the patient closely, continue the current medications, management and 
symptomatic

treatment.  Unstable angina protocol.  Acute coronary syndrome precautions.  
Otherwise,

I would monitor closely within telemetry and I would also recommend Cardiology

consultation.  Rule out myocardial infarction.  Guarded prognosis because of 
multiple

complex medical issues.  Further recommendations to follow. A copy of this 
dictation

being forwarded to Dr. Lester at [facility].





MMODL / IJN: [id number] / Job#: [id number]

JOSE R

## 2019-11-23 NOTE — CONS
CONSULTATION



Mrs. Rubin is a 65-year-old female who is [facility] who presented with

symptoms of chest discomfort.  She was recently admitted to the hospital with symptoms

of chest discomfort and was seen by Dr. Palacios.  She has a history of peripheral vascular

disease status post aortobifem bypass as well as a history of hypertension, prior

history of smoking.  She had prior myocardial perfusion imaging done at [facility] that showed no evidence of ischemia at that time. Because of her

persistent symptoms during her most recent admission, after being seen by Dr. Palacios she

underwent cardiac catheterization and was found to have ostial RCA disease.  She

underwent stenting of that vessel.  She went home and woke up again with chest

discomfort that was persistent, came into the emergency room and subsequently admitted.

She had an echocardiogram performed that revealed preserved left ventricular size and

systolic function during her last admission.  Her discomfort in the chest is non

exertion pattern and not associated with any other symptoms.  She denies any dizziness

or palpitation.  She denies any syncope.  Her coronary risk factors are positive for

hyperlipidemia and hypertension.  She is a nonsmoker, nondiabetic.



MEDICATION:

Her medications at home include aspirin, Lipitor 80 mg daily, Coreg 6.5 mg twice a day,

Plavix 75 mg daily, losartan 25 mg daily, sertraline, and Protonix.



REVIEW OF SYSTEMS:

RESPIRATORY SYSTEM: She has dyspnea on exertion.  No recent fever.  She has some cough.

GI SYSTEM:  No recent GI bleeding.  No peptic ulcer disease.

 SYSTEM:  No dysuria or hematuria.

NERVOUS SYSTEM:  No stroke or seizure.



PHYSICAL EXAMINATION:

65-year-old female, alert, oriented, no apparent distress.  Blood pressure running in

the 110s to 150s with a heart in the 60s.

LUNGS:  Clear.

HEART:  Regular rate and rhythm. S1, S2.  No S3 with systolic ejection murmur at the

base.  No diastolic murmur, no rub.

ABDOMEN:  Soft, nontender.  Positive bowel sounds.  No organomegaly.

EXTREMITIES:  No edema. Radial pulse intact.



LAB DATA:

Lab data revealed BUN and creatinine 23 and 0.87, potassium 4.1.  Troponin less than

0.012 for 3 samples.  Hemoglobin of 14.2.



EKG revealed a sinus mechanism, normal axis and intervals.  Normal echocardiogram.  She

had a CT angiogram of the chest that showed evidence of emphysematous changes with

hepatic steatosis.



IMPRESSION:

1. Chest discomfort atypical for ischemic heart disease.  No evidence to suggest acute

    coronary syndrome.  At this point, if we are worried about the stent, acute stent

    thrombosis would be the presentation.  She had no evidence of any EKG or troponin

    changes.

2. History of peripheral vascular disease.

3. Hyperlipidemia.

4. Hypertension.



RECOMMENDATION:

From the cardiac standpoint, I will continue present therapy.  Patient should be able

to be discharged home today and followed as an outpatient with Dr. Palacios.



Thank you for this consult.  We will follow with you.





MMODL / IJN: [id number] / Job#: [id number]

## 2019-11-23 NOTE — PN
PROGRESS NOTE



DATE OF SERVICE:

11/23/2019.



This 65-year-old woman is admitted with significant chest pain and is being closely

monitored at this time.  The patient still has not much relief at this time.  A chest

CTA was also done which showed no evidence of any pulmonary embolism and advanced

emphysematous changes also noted without any evidence of consolidation.  Cardiology

following the patient closely.  No chest pain.  No palpitations.  No fever.



EXAM:

Alert and oriented times three.  Pulse 69, blood pressure 140/71, respiration 18,

temperature 97.4, pulse ox 93% on 2 L.

HEENT is conjunctivae normal.

NECK: No JVD.

CARDIOVASCULAR: S1, S2 muffled.

RESPIRATIONS: Breath sounds diminished in the bases.  No rhonchi.  No crackles.

ABDOMEN is soft, nontender.

LEGS are no edema. No swelling.

CENTRAL NERVOUS SYSTEM: No focal deficits.



LABORATORY DATA:

CBC within normal limits.  Sodium 139.



ASSESSMENT:

1. Chest pain, myocardial infarction ruled out.  Rule out unstable angina.

2. History of coronary artery disease and recent stent to the RCA.

3. Elevated ALT at 64.

4. History of gastroesophageal reflux disease.

5. Hypertension.

6. History of liver disease.

7. History of cholecystectomy.

8. History of degenerative joint disease.

9. History of tonsillectomy.

10.History of pain stimulator removal.

11.History of left femoral endarterectomy.

12.History of depression.

13.Remote history of nicotine dependence.

14.Obesity with body mass index of 37.9.

15.Multiple allergies.



RECOMMENDATIONS AND DISCUSSION:

I recommend to continue current medications, monitoring, management and symptomatic

treatment.  Otherwise, increase ambulation.  Closely follow with Cardiology.  Prognosis

guarded.  Discussed with the patient and  at length.  Currently the troponins

are negative.  Further recommendations to follow.





MMODL / IJN: [id number] / Job#: [id number]

## 2019-11-24 NOTE — PN
PROGRESS NOTE



Mrs. Rubin is a 65-year-old female history of hypertension, hyperlipidemia, who

recently underwent percutaneous revascularization of her right coronary artery,

presented with atypical chest pain.  She is feeling better today.  Her breathing is

stable.  She denies any dizziness or palpitation.  She denies any nausea.  She denies

any cough.  She has been ambulating.  She has a known history of peripheral vascular

disease, hyperlipidemia, hypertension.  She continues to be at this time on aspirin

once a day, Plavix 75 mg daily, losartan 25 mg daily, Lipitor 80 mg daily and Coreg 6.5

mg twice a day.



PHYSICAL EXAMINATION:

Blood pressure 122/70 with a heart rate in the 70s.

LUNGS:  Clear.

HEART:  Regular rate and rhythm S1, S2.  No S3.  No rub.

ABDOMEN:  Soft nontender.

EXTREMITIES:  No edema.



IMPRESSION:

1. Chest discomfort, atypical for ischemic heart disease.  No evidence of acute

    coronary syndrome.

2. History of coronary artery disease.

3. Hypertension.

4. Hyperlipidemia.

5. Peripheral vascular disease.



RECOMMENDATIONS:

From the cardiac standpoint, she should be able to be discharged home today and

followed by Dr. Palacios as an outpatient.





MMODL / IJN: [id number] / Job#: [id number]

## 2019-11-24 NOTE — PN
PROGRESS NOTE



DATE OF SERVICE:

11/24/2019



This 65-year-old woman was admitted with chest pain, mainly complaining of right-sided

chest pain radiating to the arm.  The patient is able to ambulate.  The patient is

taking pain medications, IV pain medications almost around the clock at this time.

There is no evidence of any acute coronary syndrome per Cardiology who is following the

patient closely.  No fever.  No shortness of breath.  The chest CTA was also done

showing no evidence of any pulmonary embolism.



EXAM:

Alert and oriented x3.  Pulse 67. Blood pressure 110/70.  Respirations 18.  Temperature

97.8, pulse ox 93% on room air.

HEENT is conjunctivae normal.

NECK: No JVD.

CARDIOVASCULAR:  S1, S2 muffled.

RESPIRATION: Breath sounds diminished in the bases.  No rhonchi.  No crackles.  ABDOMEN

is soft, nontender.  No mass palpable.

LEGS are no edema. No swelling.

CENTRAL NERVOUS SYSTEM:  No focal deficits.



LABS:

UA possible urinary tract infection.



ASSESSMENT:

1. Chest pain, myocardial infarction ruled out.  Rule out unstable angina possibly

    musculoskeletal pain.

2. History of coronary artery disease and recent stent to the RCA.

3. Elevated ALT 64.

4. History of gastroesophageal reflux disease.

5. Possible urinary tract infection, present on admission.

6. Hypertension.

7. History of liver disease.

8. History of cholecystectomy.

9. History of cholecystectomy.

10.History of degenerative joint disease.

11.History of tonsillectomy.

12.History of pain stimulator removal.

13.History of left femoral endarterectomy.

14.History of depression.

15.Remote history of nicotine dependence.

16.Obesity with body mass index 37.9.

17.Multiple allergies.



RECOMMENDATIONS AND DISCUSSION:

I recommend to continue current medications, management and symptomatic treatment.

Increase ambulation.  Otherwise, closely follow with Cardiology.  Empiric antibiotics.

Urine culture.  Guarded prognosis because of multiple complex medical issues. Further

recommendations to follow.





MMODL / IJN: [id number] / Job#: [id number]

## 2019-11-25 NOTE — DS
DISCHARGE SUMMARY



DATE OF SERVICE:

11/25/2019



FINAL DIAGNOSES:

1. Chest pain possibly musculoskeletal, myocardial infarction ruled out.

2. History of recent coronary artery disease and recent stent to RCA.

3. Elevated ALT 64.

4. History of gastroesophageal reflux disease.

5. Urinary tract infection, present on admission.

6. Hypertension.

7. History of liver disease.

8. History of cholecystectomy.

9. History of degenerative joint disease.

10.History of tonsillectomy.

11.History of pain stimulator.

12.History of left femoral endarterectomy.

13.History of depression.

14.Remote history of nicotine dependence.

15.History of obesity, body mass index of 37.9.

16.Multiple allergies.



DISCHARGE DISPOSITION:

The patient will be discharged in stable condition with guarded prognosis. Cardiology

cleared the patient for discharge.



HISTORY OF PRESENT ILLNESS:

This 65-year-old woman with a past medical history of multiple medical problems

including recent RCA stenting by Dr. Palacios was admitted with chest pain.  Myocardial

infarction ruled out.  Pain was mainly on the right side and radiating to the right arm

also.  Cardiology recommended outpatient followup.  Please refer to consultations and

progress notes for further details.  Chest CTA was negative for pulmonary embolism.



On exam, vitals are stable.

CARDIOVASCULAR: S1, S2 muffled.

ABDOMEN: Soft.

NERVOUS SYSTEM: No focal deficits.



DISCHARGE ADVICE AND MEDICATIONS:

1. Diet is cardiac.

2. Activity limited until followup.

3. Follow up with Dr. Palacios in 2 to 3 days.

4. Follow up with Dr. Lester as advised.

MEDICATIONS:

1. Iron sulfate 325 mg p.o. daily.

2. Prednisone taper as before.

3. Protonix 40 mg p.o. daily.

4. Vitamin D3, 1000 daily.

5. Zoloft 100 mg p.o. daily.

6. Zyrtec 10 mg p.o. daily.

7. Aspirin 81 mg p.o. daily.

8. Bactrim DS 1 p.o. b.i.d. for 3 days.

9. Coreg 6.25 mg p.o. b.i.d.

10.Cozaar 25 mg p.o. daily.

11.Lipitor 80 mg p.o. daily.

12.Nitrostat 0.4 sublingual p.r.n.

13.Norco 5 mg q.6 p.r.n.

14.Plavix 75 mg p.o. daily.

15.Tylenol p.r.n.

Once again, the patient will be discharged in a stable condition with guarded

prognosis.





MMJOAO / MICKEYN: [id number] / Job#: [id number]

## 2019-12-26 NOTE — P.HPIM
History of Present Illness


Patient is a pleasant 60-year-old female came in with compensable elevated blood

pressures blood pressure elevated more than 200.  Patient upon questioning it 

complain of chest pain that goes across the chest without any diaphoresis 

without any nausea vomiting lightheadedness.  Patient pain radiates the legs his

anus.  Patient denied any back pain issues patient used to have chronic back 

pain in the past for which patient has a pain pump.  Patient did show HER blood 

pressure readings all of them appear to be elevated with systolic above 170.  

Patient had a recent cardiac catheterization and stent placed to RCA.  Her blood

pressure medications doses were being increased despite of which patient blood 

pressures not coming under control.  Patient probably will need a diuretic 

therapy for blood pressure control patient is on nystatin Ernestine Laine dissector

blocker and a beta blocker.  Patient blood pressures come down now I do not 

decreased more than what she is now which can lead to sudden drop in blood 

pressure and strokes because of which I'm not adding anything but eventually 

patient will need diuretic therapy.  Chest x-ray did show some bronchovascular 

markings consistent with the heart failure although I did not appreciate any JVD

and obtain a BNP.  Patient has normal ejection fraction the past patient will be

given a dose of Lasix.  Patient does have any other symptoms of hypertensive 

urgency including blurry vision, abdominal pain or confusion.  Patient did 

complain of shortness of breath denied any significant orthopnea paroxysmal 

nocturnal dyspnea








Review of Systems








REVIEW OF SYSTEMS: 


CONSTITUTIONAL: No fever, no malaise, no fatigue. 


HEENT: No recent visual problems or hearing problems. Denied any sore throat. 


CARDIOVASCULAR:, no palpitations, no syncope. 


PULMONARY: no cough, no hemoptysis. 


GASTROINTESTINAL: No diarrhea, no nausea, no vomiting, no abdominal pain. 


NEUROLOGICAL: No headaches, no weakness, no numbness. 


HEMATOLOGICAL: Denies any bleeding or petechiae. 


GENITOURINARY: Denies any burning micturition, frequency, or urgency. 


MUSCULOSKELETAL/RHEUMATOLOGICAL: Denies any joint pain, swelling, or any muscle 

pain. 


ENDOCRINE: Denies any polyuria or polydipsia. 





The rest of the 14-point review of systems is negative.











Past Medical History


Past Medical History: Coronary Artery Disease (CAD), GERD/Reflux, Hypertension, 

Liver Disease


Additional Past Medical History / Comment(s): NERVE DAMAGE AND PAIN, anemia


History of Any Multi-Drug Resistant Organisms: None Reported


Past Surgical History: Cholecystectomy, Heart Catheterization With Stent, 

Hysterectomy, Orthopedic Surgery, Tonsillectomy


Additional Past Surgical History / Comment(s): AORTIC SURGERY, pt has a pain 

stimulator and it has been moved, B heels, L femoral endartectomy


Past Anesthesia/Blood Transfusion Reactions: No Reported Reaction


Date of Last Stent Placement:: 11-


Past Psychological History: Depression


Smoking Status: Former smoker


Past Alcohol Use History: None Reported


Past Drug Use History: None Reported





- Past Family History


  ** Mother


Family Medical History: No Reported History





  ** Father


Family Medical History: Coronary Artery Disease (CAD)





Medications and Allergies


                                Home Medications











 Medication  Instructions  Recorded  Confirmed  Type


 


Cetirizine HCl [Zyrtec] 10 mg PO DAILY 11/17/19 12/26/19 History


 


Cholecalciferol [Vitamin D3 (25 1,000 unit PO DAILY 11/17/19 12/26/19 History





Mcg = 1000 Iu)]    


 


Ferrous Sulfate [Iron (65  mg PO DAILY 11/17/19 12/26/19 History





Elemental)]    


 


Pantoprazole Sodium [Protonix] 40 mg PO DAILY 11/17/19 12/26/19 History


 


Sertraline [Zoloft] 100 mg PO DAILY 11/17/19 12/26/19 History


 


Aspirin 81 mg PO DAILY #30 chew 11/20/19 12/26/19 Rx


 


Atorvastatin [Lipitor] 80 mg PO DAILY #30 tab 11/20/19 12/26/19 Rx


 


Clopidogrel [Plavix] 75 mg PO DAILY #30 tab 11/20/19 12/26/19 Rx


 


Nitroglycerin Sl Tabs [Nitrostat] 0.4 mg SUBLINGUAL Q5M PRN #25 tab 11/20/19 12/26/19 Rx


 


Carvedilol [Coreg] 12.5 mg PO AC-BID 12/26/19 12/26/19 History


 


Losartan [Cozaar] 100 mg PO DAILY 12/26/19 12/26/19 History








                                    Allergies











Allergy/AdvReac Type Severity Reaction Status Date / Time


 


albuterol Allergy  Rash/Hives Verified 12/26/19 16:04


 


cortisone Allergy  Swelling/Pain Verified 12/26/19 16:04





   at inj site  


 


erythromycin base Allergy  Rash/Hives Verified 12/26/19 16:04


 


fentanyl [From Duragesic] Allergy  Unknown Verified 12/26/19 16:04





   Childhood  


 


iodine Allergy  Anaphylaxis Verified 12/26/19 16:04


 


lisinopril Allergy  Swelling Verified 12/26/19 16:04


 


modafinil Allergy  Unknown Verified 12/26/19 16:04


 


nystatin Allergy  Rash/Hives Verified 12/26/19 16:04


 


procaine [From Novocain] Allergy  Swelling Verified 12/26/19 16:04


 


vancomycin Allergy  Rash/Hives Verified 12/26/19 16:04


 


amlodipine AdvReac  Nausea & Verified 12/26/19 16:04





   Vomiting &  





   Diarrhea  


 


aspirin AdvReac  Abdominal Verified 12/26/19 16:04





   Pain  


 


cimetidine [From Tagamet] AdvReac  dizziness Verified 12/26/19 16:04


 


isosorbide AdvReac  Nausea & Verified 12/26/19 16:04





   Vomiting &  





   Diarrhea  


 


labetalol AdvReac  Nausea & Verified 12/26/19 16:04





   Vomiting &  





   Diarrhea  


 


metoprolol AdvReac  Diarrhea Verified 12/26/19 16:04


 


morphine AdvReac  Diarrhea Verified 12/26/19 16:04


 


oxcarbazepine AdvReac  hair loss Verified 12/26/19 16:04





[From Trileptal]     


 


paroxetine [From Paxil] AdvReac  tremors Verified 12/26/19 16:04














Physical Exam


Vitals: 


                                   Vital Signs











  Temp Pulse Resp BP Pulse Ox


 


 12/26/19 18:27  98.0 F  68  20  159/68  100


 


 12/26/19 17:00   75  19  136/70  100


 


 12/26/19 16:40   83  20  125/59 


 


 12/26/19 16:21   67  20  119/51  99


 


 12/26/19 15:40   70  20  203/101  99


 


 12/26/19 13:18  98.0 F  82  22  209/107  94 L








                                Intake and Output











 12/26/19 12/26/19 12/26/19





 06:59 14:59 22:59


 


Other:   


 


  Weight  89.04 kg 

















PHYSICAL EXAMINATION: 





GENERAL: The patient is alert and oriented x3, not in any acute distress. Well 

developed, well nourished. 


HEENT: Pupils are round and equally reacting to light. EOMI. No scleral icterus.

No conjunctival pallor. Normocephalic, atraumatic. No pharyngeal erythema. No 

thyromegaly. 


CARDIOVASCULAR: S1 and S2 present. No murmurs, rubs, or gallops. 


PULMONARY: Chest is clear to auscultation, no wheezing or crackles. 


ABDOMEN: Soft, nontender, nondistended, normoactive bowel sounds. No palpable 

organomegaly. 


MUSCULOSKELETAL: No joint swelling or deformity.


EXTREMITIES: No cyanosis, clubbing, or pedal edema. 


NEUROLOGICAL: Gross neurological examination did not reveal any focal deficits. 


SKIN: No rashes. 

















Results


CBC & Chem 7: 


                                 12/26/19 13:58





                                 12/26/19 13:58





Assessment and Plan


Plan: 


Accelerated hypertension and possibility of hypertensive emergency.  Blood 

pressures come down patient will not need any IV antihypertensive e medications 

patient didn't have chest pain we'll repeat 2 more sets of troponins and EKGs.  

Patient does have some T-wave flattening and inversions which are not present in

Previous EKGs.  Cardiology will be consulted counseling that she had a recent 

cardiac catheterization.  Patient will benefit from diuretic therapy since I 

have a suspicion of heart failure I'll give a dose of Lasix for now although 

patient doesn't have any JVD had a normal ejection fraction the past but her 

chest x-ray showed some pulmonary vascular congestion.  Patient may need 

diuretic for blood pressure control


-Carotid artery disease with recent stent we'll rule out a concurrent syndromes 

will be resumed on her medications for coronary artery disease


-Gastroesophageal reflux disease


-depression

## 2019-12-26 NOTE — XR
EXAMINATION TYPE: XR chest 2V

 

DATE OF EXAM: 12/26/2019

 

COMPARISON: 11/22/2019

 

HISTORY: 65-year-old female with hypertension

 

TECHNIQUE:  PA and lateral views

 

FINDINGS:  

Heart normal size. Aorta and pulmonary vasculature within normal limits. Interstitial densities with 
peribronchial cuffing. Possible trace effusions on the lateral view. No consolidation. Spinal stimula
tor array centered along the mid thoracic spinal canal.

 

 

IMPRESSION:  

Increased interstitial densities. Possible trace effusions. Correlate for bronchitis, atypical pneumo
nias, or mild pulmonary vascular congestion.

## 2019-12-26 NOTE — ED
General Adult HPI





- General


Chief complaint: Recheck/Abnormal Lab/Rx


Stated complaint: Hypertensive, SOB


Time Seen by Provider: 12/26/19 13:40


Source: patient


Mode of arrival: ambulatory


Limitations: no limitations





- History of Present Illness


Initial comments: 


Dictation was produced using dragon dictation software. please excuse any 

grammatical, word or spelling errors. 





Chief Complaint: 65-year-old female presents with elevated blood pressure.





History of Present Illness: 65-year-old female she presents here today with 

chief complaint of elevated blood pressure.  Patient reports that she checks her

blood pressure at home regularly.  Today she took her blood pressure and her 

systolic was above 200.  She waited for a little bit checked it again with 

similar result.  Patient states that she was instructed by her doctor Damon come to

the emergency department if she has elevated blood pressures that high.  Patient

is on multiple medications for blood pressure.  She has been having her primary 

care physician's slowly increase her dose.  Patient reports that she does have a

slight headache.  Denies any strokelike symptoms.  Patient states his headache 

is at the worse headache of her life.  She states that it's a crampy headache.  

She reports that this headache is somewhat to headache she had in the past.  

While waiting to be seen she reports that she feels as though she can feel her 

eyes and arms throbbing she reports that she said she can feel her blood 

pressure elevating.  Denies  any chest pain or shortness of breath.








The ROS documented in this emergency department record has been reviewed and 

confirmed by me.  Those systems with pertinent positive or negative responses 

have been documented in the HPI.  All other systems are other negative and/or 

noncontributory.








PHYSICAL EXAM:


General Impression: Alert and oriented x3, not in acute distress


HEENT: Normocephalic atraumatic, extra-ocular movements intact, pupils equal and

reactive to light bilaterally, mucous membranes moist.


Cardiovascular: Heart regular rate and rhythm, S1&S2 audible, no murmurs, rubs 

or gallops


Chest: Lungs clear to auscultation bilaterally, no rhonchi, no wheeze, no rales


Abdomen: Bowel sounds present, abdomen soft, non-tender, non-distended, no 

organomegaly


Musculoskeletal: Pulses present and equal in all extremities, no peripheral 

edema


Motor:  no focal deficits noted


Neurological: CN II-XII grossly intact, no focal motor or sensory deficits noted


Skin: Intact with no visualized rashes


Psych: Normal affect and mood





ED course: 65 Year old female presents with elevated blood pressure.  All signs 

upon arrival shows blood pressure 77199, worse vital signs within acceptable 

limits.  Patient's well-appearing.  She is does not have any strokelike 

symptoms.  She is not dyspneic has equal pulses.


Laboratory evaluation obtained.  CBC, metabolic panel is unremarkable.  

Troponins negative.  While in the emergency department patient began complaining

of chest pain.  She states severe.  EKGs benign.  Chest x-ray was obtained s

howing no acute processes.  There is concern that perhaps patient's symptoms 

were from aortic dissection.  Thoracic aorta CT was performed.  There is 

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  She has bilateral

iliac artery bypass grafts that appear to be stable.  Patient given aspirin.  

Patient evaluated bedside Courtney stable clinical condition.  Blood pressures 

improved.  She still complaining of chest pain.  Patient be admitted for suture 

troponins and cardiology consultation.  Vascular surgery will also be consulted 

for incidental finding of celiac artery subtotal occlusion.





EKG interpretation: Ventricular rate 63, normal sinus rhythm, KS interval 162, Q

70, QTc 468. No KS prolongation, no QTC prolongation, no ST or T-wave changes 

noted.  EKG compared to laboratories second 2019 showing no changes.  Overall, 

this EKG is unremarkable














- Related Data


                                Home Medications











 Medication  Instructions  Recorded  Confirmed


 


Cetirizine HCl [Zyrtec] 10 mg PO DAILY 11/17/19 12/26/19


 


Cholecalciferol [Vitamin D3 (25 1,000 unit PO DAILY 11/17/19 12/26/19





Mcg = 1000 Iu)]   


 


Ferrous Sulfate [Iron (65  mg PO DAILY 11/17/19 12/26/19





Elemental)]   


 


Pantoprazole Sodium [Protonix] 40 mg PO DAILY 11/17/19 12/26/19


 


Sertraline [Zoloft] 100 mg PO DAILY 11/17/19 12/26/19


 


Carvedilol [Coreg] 12.5 mg PO AC-BID 12/26/19 12/26/19


 


Losartan [Cozaar] 100 mg PO DAILY 12/26/19 12/26/19








                                  Previous Rx's











 Medication  Instructions  Recorded


 


Aspirin 81 mg PO DAILY #30 chew 11/20/19


 


Atorvastatin [Lipitor] 80 mg PO DAILY #30 tab 11/20/19


 


Clopidogrel [Plavix] 75 mg PO DAILY #30 tab 11/20/19


 


Nitroglycerin Sl Tabs [Nitrostat] 0.4 mg SUBLINGUAL Q5M PRN #25 tab 11/20/19











                                    Allergies











Allergy/AdvReac Type Severity Reaction Status Date / Time


 


albuterol Allergy  Rash/Hives Verified 12/26/19 16:04


 


cortisone Allergy  Swelling/Pain Verified 12/26/19 16:04





   at inj site  


 


erythromycin base Allergy  Rash/Hives Verified 12/26/19 16:04


 


fentanyl [From Duragesic] Allergy  Unknown Verified 12/26/19 16:04





   Childhood  


 


iodine Allergy  Anaphylaxis Verified 12/26/19 16:04


 


lisinopril Allergy  Swelling Verified 12/26/19 16:04


 


modafinil Allergy  Unknown Verified 12/26/19 16:04


 


nystatin Allergy  Rash/Hives Verified 12/26/19 16:04


 


procaine [From Novocain] Allergy  Swelling Verified 12/26/19 16:04


 


vancomycin Allergy  Rash/Hives Verified 12/26/19 16:04


 


amlodipine AdvReac  Nausea & Verified 12/26/19 16:04





   Vomiting &  





   Diarrhea  


 


aspirin AdvReac  Abdominal Verified 12/26/19 16:04





   Pain  


 


cimetidine [From Tagamet] AdvReac  dizziness Verified 12/26/19 16:04


 


isosorbide AdvReac  Nausea & Verified 12/26/19 16:04





   Vomiting &  





   Diarrhea  


 


labetalol AdvReac  Nausea & Verified 12/26/19 16:04





   Vomiting &  





   Diarrhea  


 


metoprolol AdvReac  Diarrhea Verified 12/26/19 16:04


 


morphine AdvReac  Diarrhea Verified 12/26/19 16:04


 


oxcarbazepine AdvReac  hair loss Verified 12/26/19 16:04





[From Trileptal]     


 


paroxetine [From Paxil] AdvReac  tremors Verified 12/26/19 16:04














Review of Systems


ROS Statement: 


Those systems with pertinent positive or pertinent negative responses have been 

documented in the HPI.





ROS Other: All systems not noted in ROS Statement are negative.





Past Medical History


Past Medical History: Coronary Artery Disease (CAD), GERD/Reflux, Hypertension, 

Liver Disease


Additional Past Medical History / Comment(s): NERVE DAMAGE AND PAIN, anemia


History of Any Multi-Drug Resistant Organisms: None Reported


Past Surgical History: Cholecystectomy, Heart Catheterization With Stent, 

Hysterectomy, Orthopedic Surgery, Tonsillectomy


Additional Past Surgical History / Comment(s): AORTIC SURGERY, pt has a pain 

stimulator and it has been moved, B heels, L femoral endartectomy


Past Anesthesia/Blood Transfusion Reactions: No Reported Reaction


Date of Last Stent Placement:: 11-


Past Psychological History: Depression


Smoking Status: Former smoker


Past Alcohol Use History: None Reported


Past Drug Use History: None Reported





- Past Family History


  ** Mother


Family Medical History: No Reported History





  ** Father


Family Medical History: Coronary Artery Disease (CAD)





General Exam


Limitations: no limitations





Course


                                   Vital Signs











  12/26/19 12/26/19 12/26/19





  13:18 15:40 16:21


 


Temperature 98.0 F  


 


Pulse Rate 82 70 67


 


Respiratory 22 20 20





Rate   


 


Blood Pressure 209/107 203/101 119/51


 


O2 Sat by Pulse 94 L 99 99





Oximetry   














  12/26/19 12/26/19





  16:40 17:00


 


Temperature  


 


Pulse Rate 83 75


 


Respiratory 20 19





Rate  


 


Blood Pressure 125/59 136/70


 


O2 Sat by Pulse  100





Oximetry  














Medical Decision Making





- Lab Data


Result diagrams: 


                                 12/26/19 13:58





                                 12/26/19 13:58


                                   Lab Results











  12/26/19 12/26/19 12/26/19 Range/Units





  13:58 13:58 13:58 


 


WBC   5.0   (3.8-10.6)  k/uL


 


RBC   5.02   (3.80-5.40)  m/uL


 


Hgb   14.3   (11.4-16.0)  gm/dL


 


Hct   41.4   (34.0-46.0)  %


 


MCV   82.4   (80.0-100.0)  fL


 


MCH   28.5   (25.0-35.0)  pg


 


MCHC   34.6   (31.0-37.0)  g/dL


 


RDW   14.9   (11.5-15.5)  %


 


Plt Count   172   (150-450)  k/uL


 


Neutrophils %   62   %


 


Lymphocytes %   27   %


 


Monocytes %   5   %


 


Eosinophils %   4   %


 


Basophils %   1   %


 


Neutrophils #   3.1   (1.3-7.7)  k/uL


 


Lymphocytes #   1.3   (1.0-4.8)  k/uL


 


Monocytes #   0.3   (0-1.0)  k/uL


 


Eosinophils #   0.2   (0-0.7)  k/uL


 


Basophils #   0.0   (0-0.2)  k/uL


 


Sodium  143    (137-145)  mmol/L


 


Potassium  3.9    (3.5-5.1)  mmol/L


 


Chloride  107    ()  mmol/L


 


Carbon Dioxide  24    (22-30)  mmol/L


 


Anion Gap  12    mmol/L


 


BUN  13    (7-17)  mg/dL


 


Creatinine  0.82    (0.52-1.04)  mg/dL


 


Est GFR (CKD-EPI)AfAm  87    (>60 ml/min/1.73 sqM)  


 


Est GFR (CKD-EPI)NonAf  75    (>60 ml/min/1.73 sqM)  


 


Glucose  89    (74-99)  mg/dL


 


Calcium  9.7    (8.4-10.2)  mg/dL


 


Troponin I    <0.012  (0.000-0.034)  ng/mL














Disposition


Clinical Impression: 


 Hypertension





Disposition: ADMITTED AS IP TO THIS HOSP


Condition: Fair


Referrals: 


[facility],Clinic [Primary Care Provider] - 1-2 days


Decision Time: 18:10

## 2019-12-26 NOTE — CT
EXAMINATION TYPE: CT angio thor/abd pel aorta

 

DATE OF EXAM: 12/26/2019

 

COMPARISON: 8/21/2019

 

HISTORY: Chest pain

 

CT DLP: 3022 mGycm

Automated exposure control for dose reduction was used.

 

CONTRAST: 

Performed without and with IV Contrast, patient injected with 100 mL of Isovue 370.

 

 

Multiple axial sections were obtained from the thoracic inlet to the floor the pelvis without and wit
h IV contrast. There are 3-D post processed images.

 

There is patchy pulmonary emphysema. There is no evidence of a pulmonary mass. There is small left pl
eural effusion. There is fatty infiltration of the liver. There is no mediastinal adenopathy. Thoraci
c aorta shows no aneurysm or dissection. There are no hilar masses. I see no filling defects in the p
ulmonary arteries.

 

Abdominal aorta is intact. There is atherosclerotic plaque in the abdominal aorta. There is patency o
f the celiac artery and superior mesenteric artery. There is subtotal occlusion of the origin of the 
celiac artery. There is bilateral arterial flow in the renal arteries. There is aortoiliac artery byp
ass graft which appears patent. There is extensive thrombus in the native iliac arteries. There is ar
terial flow in the proximal femoral arteries.

 

There is no free fluid in the pelvis. Bladder distends smoothly. There is no mesenteric edema. Kidney
s show satisfactory contrast opacification. There is no hydronephrosis. Spleen pancreas stomach appea
r intact. There are clips from cholecystectomy. Bile ducts are not dilated. Thoracic and lumbar spine
 appear intact. There is metal artifact from surgery in the posterior thoracic spine.

 

IMPRESSION:

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 Atherosclerotic plaque formation. Bilateral patency
 of the iliac artery bypass grafts. Exam unchanged compared to old exam.

 

Fatty infiltration of the liver. No evidence of pulmonary embolism. Pulmonary emphysema.

## 2019-12-27 NOTE — P.GSCN
History of Present Illness


Consult date: 12/27/19


Reason for Consult: 





Possible occlusion of proximal celiac artery


Hypertensive urgency


History of present illness: 





Patient is a pleasant 60-year-old white female who came into the ER with 

elevated blood pressure after monitoring at home with a systolic greater than 

200.  Patient had some complaints of chest pain and epigastric pain when she c

ulisses into the emergency room, denies any nausea vomiting or diarrhea.  Patient 

states she does have a past history of Nielsen's esophagus and follows with Dr. Pérez which she states has had an EGD within the last 2 years.  The patient had 

a recent cardiac cath with placement in November 2019.  The patient is known to 

Dr. Courtney, has seen Dr. Hoffmann in the past with a history of right leg stent, 

aortic thrombectomy and aortoiliac artery bypass graft, and a left common 

femoral endartectomy.  The patient also has a history of chronic back pain for 

which she has a pain pump and follows with pain management.  The patient denies 

any current chest pain shortness of breath or abdominal pain.  CT angiogram 

thoracic aorta shows patency of the celiac artery and superior mesenteric 

artery, with subtotal occlusion in the proximal celiac artery, arterial iliac 

artery bypass graft with patency. 





Review of Systems





14 point review of systems completed pertinent positive and negatives listed in 

the HPI





Past Medical History


Past Medical History: Coronary Artery Disease (CAD), GERD/Reflux, Hypertension, 

Liver Disease


Additional Past Medical History / Comment(s): NERVE DAMAGE AND PAIN, anemia


History of Any Multi-Drug Resistant Organisms: None Reported


Past Surgical History: Cholecystectomy, Heart Catheterization With Stent, 

Hysterectomy, Orthopedic Surgery, Tonsillectomy


Additional Past Surgical History / Comment(s): AORTIC SURGERY, pt has a pain 

stimulator and it has been moved, B heels, L femoral endartectomy


Past Anesthesia/Blood Transfusion Reactions: No Reported Reaction


Date of Last Stent Placement:: 11-


Past Psychological History: Depression


Smoking Status: Former smoker


Past Alcohol Use History: None Reported


Past Drug Use History: None Reported





- Past Family History


  ** Mother


Family Medical History: No Reported History





  ** Father


Family Medical History: Coronary Artery Disease (CAD)





Medications and Allergies


                                Home Medications











 Medication  Instructions  Recorded  Confirmed  Type


 


Cetirizine HCl [Zyrtec] 10 mg PO DAILY 11/17/19 12/26/19 History


 


Cholecalciferol [Vitamin D3 (25 1,000 unit PO DAILY 11/17/19 12/26/19 History





Mcg = 1000 Iu)]    


 


Ferrous Sulfate [Iron (65  mg PO DAILY 11/17/19 12/26/19 History





Elemental)]    


 


Pantoprazole Sodium [Protonix] 40 mg PO DAILY 11/17/19 12/26/19 History


 


Sertraline [Zoloft] 100 mg PO DAILY 11/17/19 12/26/19 History


 


Aspirin 81 mg PO DAILY #30 chew 11/20/19 12/26/19 Rx


 


Atorvastatin [Lipitor] 80 mg PO DAILY #30 tab 11/20/19 12/26/19 Rx


 


Clopidogrel [Plavix] 75 mg PO DAILY #30 tab 11/20/19 12/26/19 Rx


 


Nitroglycerin Sl Tabs [Nitrostat] 0.4 mg SUBLINGUAL Q5M PRN #25 tab 11/20/19 12/26/19 Rx


 


Carvedilol [Coreg] 12.5 mg PO AC-BID 12/26/19 12/26/19 History


 


Losartan [Cozaar] 100 mg PO DAILY 12/26/19 12/26/19 History








                                    Allergies











Allergy/AdvReac Type Severity Reaction Status Date / Time


 


albuterol Allergy  Rash/Hives Verified 12/26/19 16:04


 


cortisone Allergy  Swelling/Pain Verified 12/26/19 16:04





   at inj site  


 


erythromycin base Allergy  Rash/Hives Verified 12/26/19 16:04


 


fentanyl [From Duragesic] Allergy  Unknown Verified 12/26/19 16:04





   Childhood  


 


iodine Allergy  Anaphylaxis Verified 12/26/19 16:04


 


lisinopril Allergy  Swelling Verified 12/26/19 16:04


 


modafinil Allergy  Unknown Verified 12/26/19 16:04


 


nystatin Allergy  Rash/Hives Verified 12/26/19 16:04


 


procaine [From Novocain] Allergy  Swelling Verified 12/26/19 16:04


 


vancomycin Allergy  Rash/Hives Verified 12/26/19 16:04


 


amlodipine AdvReac  Nausea & Verified 12/26/19 16:04





   Vomiting &  





   Diarrhea  


 


aspirin AdvReac  Abdominal Verified 12/26/19 16:04





   Pain  


 


cimetidine [From Tagamet] AdvReac  dizziness Verified 12/26/19 16:04


 


isosorbide AdvReac  Nausea & Verified 12/26/19 16:04





   Vomiting &  





   Diarrhea  


 


labetalol AdvReac  Nausea & Verified 12/26/19 16:04





   Vomiting &  





   Diarrhea  


 


metoprolol AdvReac  Diarrhea Verified 12/26/19 16:04


 


morphine AdvReac  Diarrhea Verified 12/26/19 16:04


 


oxcarbazepine AdvReac  hair loss Verified 12/26/19 16:04





[From Trileptal]     


 


paroxetine [From Paxil] AdvReac  tremors Verified 12/26/19 16:04














Surgical - Exam


                                   Vital Signs











Temp Pulse Resp BP Pulse Ox


 


 98.0 F   82   22   209/107   94 L


 


 12/26/19 13:18  12/26/19 13:18  12/26/19 13:18  12/26/19 13:18  12/26/19 13:18














General appearance: The patient is alert, oriented, in no acute distress.


HET: Head is normocephalic and atraumatic.  Pupils are equal and reactive.  

Oropharynx is clear without lesions.


Neck: Supple without lymphadenopathy.  Trachea midline.  No carotid bruit 

bilateral.


Heart: S1 S2.  Regular rate and rhythm.


Lungs: No crackles or wheezes are heard.


Abdomen: Soft, nontender, nondistended with  bowel sounds.  No peritoneal signs.

 No palpable organomegaly or masses.


Extremities: Normal skin color and turgor.  No cyanosis, rash, ulceration, 

clubbing, or edema.  Radial pulses 2/4 bilaterally.  Left DP 2/4, right 1/4, 

warm to the touch with no edema.


Neurological: No focal deficits.  Strength and sensation are grossly intact





Results





- Labs





                                 12/27/19 04:57





                                 12/27/19 04:57


                  Abnormal Lab Results - Last 24 Hours (Table)











  12/26/19 12/27/19 Range/Units





  18:50 04:57 


 


Glucose   162 H  (74-99)  mg/dL


 


POC Glucose (mg/dL)  128 H   (75-99)  mg/dL








                                 Diabetes panel











  12/26/19 12/27/19 Range/Units





  13:58 04:57 


 


Sodium  143  141  (137-145)  mmol/L


 


Potassium  3.9  4.6  (3.5-5.1)  mmol/L


 


Chloride  107  106  ()  mmol/L


 


Carbon Dioxide  24  27  (22-30)  mmol/L


 


BUN  13  16  (7-17)  mg/dL


 


Creatinine  0.82  0.93  (0.52-1.04)  mg/dL


 


Glucose  89  162 H  (74-99)  mg/dL


 


Calcium  9.7  9.3  (8.4-10.2)  mg/dL








                                  Calcium panel











  12/26/19 12/27/19 Range/Units





  13:58 04:57 


 


Calcium  9.7  9.3  (8.4-10.2)  mg/dL








                                 Pituitary panel











  12/26/19 12/27/19 Range/Units





  13:58 04:57 


 


Sodium  143  141  (137-145)  mmol/L


 


Potassium  3.9  4.6  (3.5-5.1)  mmol/L


 


Chloride  107  106  ()  mmol/L


 


Carbon Dioxide  24  27  (22-30)  mmol/L


 


BUN  13  16  (7-17)  mg/dL


 


Creatinine  0.82  0.93  (0.52-1.04)  mg/dL


 


Glucose  89  162 H  (74-99)  mg/dL


 


Calcium  9.7  9.3  (8.4-10.2)  mg/dL








                                  Adrenal panel











  12/26/19 12/27/19 Range/Units





  13:58 04:57 


 


Sodium  143  141  (137-145)  mmol/L


 


Potassium  3.9  4.6  (3.5-5.1)  mmol/L


 


Chloride  107  106  ()  mmol/L


 


Carbon Dioxide  24  27  (22-30)  mmol/L


 


BUN  13  16  (7-17)  mg/dL


 


Creatinine  0.82  0.93  (0.52-1.04)  mg/dL


 


Glucose  89  162 H  (74-99)  mg/dL


 


Calcium  9.7  9.3  (8.4-10.2)  mg/dL














Assessment and Plan


Assessment: 





Occlusion of the proximal celiac artery


Hypertensive urgency 


Coronary artery disease with recent stent placement 


Gastroesophageal reflux disease


Plan: 





Myself and Dr. Courtney evaluated the patient, and reviewed the CT angiogram of 

thoracic aorta.  At this time there is no recommendation for any surgical 

intervention.  Epigastric pain likely related to GERD, patient follows with Dr. Pérez.  Continue blood pressure control.  Await further recommendations per 

cardiology consult.  Patient can follow-up with vascular surgery upon discharge.











Thank you for this consult and allowing us to participate in the care of this 

patient during her hospital stay.























The above dictated assessment and findings were discussed with Dr. Courtney.  The 

impression and plan of care have been directed as dictated.

## 2019-12-27 NOTE — P.CRDCN
History of Present Illness


Consult date: 12/27/19


History of present illness: 


This is a 65-year-old female with history of ischemic heart disease and stent 

placement of the ostial lesion of the RCA done by Dr. Aranda recently.  She has 

history of peripheral vascular disease and status post aortobifemoral bypass 

surgery, hypertension, prior history of smoking and also several ALLERGIES for 

antihypertensive medications.  Patient was admitted once after the stent 

placement with atypical chest pain.  She was discharged home on medical therapy.

 She claims that her blood pressure has been going up and up.  She is being 

followed by [facility] Aranda and also [facility].  She is concerned that her blood 

pressure is going up to 200.  She is also claimed that she not feeling well 

since stent placement.  She complains of some atypical left-sided chest pain.  

Patient came to the hospital with all these issues.  Her blood pressure was more

than 200.  She was treated with IV hydralazin, in the emergency room and 

admitted here for further evaluation.  At the time of examination patient 

symptoms are totally stable.  Her blood pressure is about 150/70.  Her EKG did 

not reveal any acute changes.  Cardiac enzymes are negative.  Her chest pains 

appear to be atypical.  We'll continue her home medication for hypertension 

control, including Coreg and also Cozaar.  I'll start her on by mouth 

hydralazine and also Imdur.  Patient could be transferred to telemetry unit.  If

stable, patient could be discharged home within next 24-48 hours.  Follow-up 

with Dr. Aranda








Review of Systems





As per the chart





Past Medical History


Past Medical History: Coronary Artery Disease (CAD), GERD/Reflux, Hypertension, 

Liver Disease


Additional Past Medical History / Comment(s): NERVE DAMAGE AND PAIN, anemia


History of Any Multi-Drug Resistant Organisms: None Reported


Past Surgical History: Cholecystectomy, Heart Catheterization With Stent, 

Hysterectomy, Orthopedic Surgery, Tonsillectomy


Additional Past Surgical History / Comment(s): AORTIC SURGERY, pt has a pain 

stimulator and it has been moved, B heels, L femoral endartectomy


Past Anesthesia/Blood Transfusion Reactions: No Reported Reaction


Date of Last Stent Placement:: 11-


Past Psychological History: Depression


Smoking Status: Former smoker


Past Alcohol Use History: None Reported


Past Drug Use History: None Reported





- Past Family History


  ** Mother


Family Medical History: No Reported History





  ** Father


Family Medical History: Coronary Artery Disease (CAD)





Medications and Allergies


                                Home Medications











 Medication  Instructions  Recorded  Confirmed  Type


 


Cetirizine HCl [Zyrtec] 10 mg PO DAILY 11/17/19 12/26/19 History


 


Cholecalciferol [Vitamin D3 (25 1,000 unit PO DAILY 11/17/19 12/26/19 History





Mcg = 1000 Iu)]    


 


Ferrous Sulfate [Iron (65  mg PO DAILY 11/17/19 12/26/19 History





Elemental)]    


 


Pantoprazole Sodium [Protonix] 40 mg PO DAILY 11/17/19 12/26/19 History


 


Sertraline [Zoloft] 100 mg PO DAILY 11/17/19 12/26/19 History


 


Aspirin 81 mg PO DAILY #30 chew 11/20/19 12/26/19 Rx


 


Atorvastatin [Lipitor] 80 mg PO DAILY #30 tab 11/20/19 12/26/19 Rx


 


Clopidogrel [Plavix] 75 mg PO DAILY #30 tab 11/20/19 12/26/19 Rx


 


Nitroglycerin Sl Tabs [Nitrostat] 0.4 mg SUBLINGUAL Q5M PRN #25 tab 11/20/19 12/26/19 Rx


 


Carvedilol [Coreg] 12.5 mg PO AC-BID 12/26/19 12/26/19 History


 


Losartan [Cozaar] 100 mg PO DAILY 12/26/19 12/26/19 History








                                    Allergies











Allergy/AdvReac Type Severity Reaction Status Date / Time


 


albuterol Allergy  Rash/Hives Verified 12/26/19 16:04


 


cortisone Allergy  Swelling/Pain Verified 12/26/19 16:04





   at inj site  


 


erythromycin base Allergy  Rash/Hives Verified 12/26/19 16:04


 


fentanyl [From Duragesic] Allergy  Unknown Verified 12/26/19 16:04





   Childhood  


 


iodine Allergy  Anaphylaxis Verified 12/26/19 16:04


 


lisinopril Allergy  Swelling Verified 12/26/19 16:04


 


modafinil Allergy  Unknown Verified 12/26/19 16:04


 


nystatin Allergy  Rash/Hives Verified 12/26/19 16:04


 


procaine [From Novocain] Allergy  Swelling Verified 12/26/19 16:04


 


vancomycin Allergy  Rash/Hives Verified 12/26/19 16:04


 


amlodipine AdvReac  Nausea & Verified 12/26/19 16:04





   Vomiting &  





   Diarrhea  


 


aspirin AdvReac  Abdominal Verified 12/26/19 16:04





   Pain  


 


cimetidine [From Tagamet] AdvReac  dizziness Verified 12/26/19 16:04


 


isosorbide AdvReac  Nausea & Verified 12/26/19 16:04





   Vomiting &  





   Diarrhea  


 


labetalol AdvReac  Nausea & Verified 12/26/19 16:04





   Vomiting &  





   Diarrhea  


 


metoprolol AdvReac  Diarrhea Verified 12/26/19 16:04


 


morphine AdvReac  Diarrhea Verified 12/26/19 16:04


 


oxcarbazepine AdvReac  hair loss Verified 12/26/19 16:04





[From Trileptal]     


 


paroxetine [From Paxil] AdvReac  tremors Verified 12/26/19 16:04














Physical Exam


Vitals: 


                                   Vital Signs











  Temp Pulse Resp BP Pulse Ox


 


 12/27/19 09:00     161/64 


 


 12/27/19 08:00  97.6 F  75  13  180/102  96


 


 12/27/19 07:00   73  19  177/79  97


 


 12/27/19 06:00   55 L  15  172/76  97


 


 12/27/19 05:00   68  19  155/82  95


 


 12/27/19 04:00   64  14  162/72  92 L


 


 12/27/19 03:00   63  12  154/76  93 L


 


 12/27/19 02:00   63  13  152/78  95


 


 12/27/19 01:00   51 L  10 L  142/78  92 L


 


 12/27/19 00:00   58 L  11 L  135/74  92 L


 


 12/26/19 23:01   64  14  117/61  89 L


 


 12/26/19 22:00   64  22  140/66  90 L


 


 12/26/19 21:00   87  25 H  140/66  91 L


 


 12/26/19 20:00  98.4 F  66  12  153/89  95


 


 12/26/19 19:06   71  15  192/96  98


 


 12/26/19 19:00   67  9 L  192/96  97


 


 12/26/19 18:27  98.0 F  68  20  159/68  100


 


 12/26/19 17:00   75  19  136/70  100


 


 12/26/19 16:40   83  20  125/59 


 


 12/26/19 16:21   67  20  119/51  99


 


 12/26/19 15:40   70  20  203/101  99


 


 12/26/19 13:18  98.0 F  82  22  209/107  94 L








                                Intake and Output











 12/26/19 12/27/19 12/27/19





 22:59 06:59 14:59


 


Intake Total  400 


 


Balance  400 


 


Intake:   


 


  Oral  400 


 


Other:   


 


  # Voids  3 1


 


  Weight 89.04 kg 86 kg 














GENERAL EXAM: Patient is alert and oriented and doesn't appear to be in any 

acute distress


HEENT: Normocephalic. Normal reaction of pupils, equal size, normal range of 

extraocular motion. No erythema or exudates in the throat.


NECK: No masses, no nuchal rigidity.


CHEST: No chest wall deformity.


LUNGS: Equal air entry with no crackles or wheeze.


HEART: S1 and S2 normal with no audible mumurs or gallops. Regular rhythm, 

femorals equal on both sides..


ABDOMEN: No hepatosplenomegaly, normal bowel sounds, no guarding or rigidity.  

Previous surgical scar


SKIN: No rashes


CENTRAL NERVOUS SYSTEM: No focal deficits.


EXTREMITIES: No cyanosis, clubbing or edema.





Results





                                 12/27/19 04:57





                                 12/27/19 04:57


                                 Cardiac Enzymes











  12/26/19 12/26/19 12/27/19 Range/Units





  13:58 20:00 01:54 


 


Troponin I  <0.012  <0.012  <0.012  (0.000-0.034)  ng/mL








                                       CBC











  12/26/19 12/27/19 Range/Units





  13:58 04:57 


 


WBC  5.0  5.4  (3.8-10.6)  k/uL


 


RBC  5.02  4.80  (3.80-5.40)  m/uL


 


Hgb  14.3  13.3  (11.4-16.0)  gm/dL


 


Hct  41.4  40.3  (34.0-46.0)  %


 


Plt Count  172  164  (150-450)  k/uL








                          Comprehensive Metabolic Panel











  12/26/19 12/27/19 Range/Units





  13:58 04:57 


 


Sodium  143  141  (137-145)  mmol/L


 


Potassium  3.9  4.6  (3.5-5.1)  mmol/L


 


Chloride  107  106  ()  mmol/L


 


Carbon Dioxide  24  27  (22-30)  mmol/L


 


BUN  13  16  (7-17)  mg/dL


 


Creatinine  0.82  0.93  (0.52-1.04)  mg/dL


 


Glucose  89  162 H  (74-99)  mg/dL


 


Calcium  9.7  9.3  (8.4-10.2)  mg/dL








                               Current Medications











Generic Name Dose Route Start Last Admin





  Trade Name Freq  PRN Reason Stop Dose Admin


 


Hydrocodone Bitart/Acetaminophen  2 each  12/26/19 17:29  12/26/19 21:30





  Brilliant 5-325  PO   2 each





  TID PRN   Administration





  Pain  


 


Aspirin  81 mg  12/27/19 09:00  12/27/19 08:38





  Aspirin  PO   Not Given





  DAILY TERESO  


 


Atorvastatin Calcium  80 mg  12/27/19 09:00  12/27/19 08:37





  Lipitor  PO   80 mg





  DAILY TERESO   Administration


 


Carvedilol  12.5 mg  12/26/19 17:30  12/27/19 06:35





  Coreg  PO   12.5 mg





  AC-BID TERESO   Administration


 


Clopidogrel Bisulfate  75 mg  12/27/19 09:00  12/27/19 08:34





  Plavix  PO   75 mg





  DAILY TERESO   Administration


 


Hydralazine HCl  25 mg  12/27/19 09:30 





  Apresoline  PO  





  BID [facility]  


 


Sodium Chloride  1,000 mls @ 20 mls/hr  12/26/19 18:15  12/27/19 06:08





  Saline 0.9%  IV   Not Given





  .Q24H TERESO  


 


Isosorbide Mononitrate  30 mg  12/27/19 09:30 





  Imdur  PO  





  DAILY TERESO  


 


Loratadine  10 mg  12/27/19 09:00  12/27/19 08:34





  Claritin  PO   10 mg





  DAILY TERESO   Administration


 


Losartan Potassium  100 mg  12/27/19 09:00  12/27/19 09:23





  Cozaar  PO   100 mg





  DAILY TERESO   Administration


 


Naloxone HCl  0.2 mg  12/26/19 18:04 





  Narcan  IV  





  Q2M PRN  





  Opioid Reversal  


 


Nitroglycerin  0.4 mg  12/26/19 17:22 





  Nitrostat  SUBLINGUAL  





  Q5M PRN  





  Chest Pain  


 


Ondansetron HCl  4 mg  12/26/19 18:04 





  Zofran  IVP  





  Q8HR PRN  





  Nausea And Vomiting  


 


Pantoprazole Sodium  40 mg  12/27/19 07:30  12/27/19 06:34





  Protonix  PO   40 mg





  AC-BRKFST TERESO   Administration


 


Sertraline HCl  100 mg  12/27/19 09:00  12/27/19 09:24





  Zoloft  PO   100 mg





  DAILY TERESO   Administration








                                Intake and Output











 12/26/19 12/27/19 12/27/19





 22:59 06:59 14:59


 


Intake Total  400 


 


Balance  400 


 


Intake:   


 


  Oral  400 


 


Other:   


 


  # Voids  3 1


 


  Weight 89.04 kg 86 kg 








                                        





                                 12/27/19 04:57 





                                 12/27/19 04:57 











EKG Interpretations (text)





Sinus rhythm without any acute ST-T changes





Assessment and Plan


(1) Uncontrolled hypertension


Current Visit: Yes   Status: Acute   Code(s): I10 - ESSENTIAL (PRIMARY) 

HYPERTENSION   SNOMED Code(s): 48082456


   





(2) Chest pain


Current Visit: No   Status: Acute   Code(s): R07.9 - CHEST PAIN, UNSPECIFIED   

SNOMED Code(s): 76471212


   





(3) Peripheral vascular disease


Current Visit: Yes   Status: Acute   Code(s): I73.9 - PERIPHERAL VASCULAR 

DISEASE, UNSPECIFIED   SNOMED Code(s): 502152163


   





(4) Hypercholesterolemia


Current Visit: Yes   Status: Acute   Code(s): E78.00 - PURE 

HYPERCHOLESTEROLEMIA, UNSPECIFIED   SNOMED Code(s): 15939145


   


Plan: 


We'll add hydralazine for better control of her blood pressure.  I will also add

Imdur.  Increase activity.  If patient is stable she could be discharged home 

within next 24-48 hours

## 2019-12-27 NOTE — P.PN
Subjective


65-year-old admitted for accident and hypertension and chest pressure.  

Cardiology Evaluated the patient.  Patient will be started on diuretics for b

lood pressure and patient was started and imbued which will be continued patient

does have coronary artery disease with recent stents





Constitutional: Denied any fatigue denied any fever.


Cardio vascular: denied any chest pain, palpitations


Gastrointestinal denied any nausea vomiting


Pulmonary: Denied any shortness of breath cough


Neurologic denied any new focal deficits





All inpatient medications were reviewed and appropriate changes in these 

medications as dictated in the interval history and assessment and plan.








Objective





- Vital Signs


Vital signs: 


                                   Vital Signs











Temp  97.8 F   12/27/19 16:01


 


Pulse  73   12/27/19 16:01


 


Resp  18   12/27/19 16:01


 


BP  102/47   12/27/19 16:01


 


Pulse Ox  93 L  12/27/19 16:01








                                 Intake & Output











 12/26/19 12/27/19 12/27/19





 18:59 06:59 18:59


 


Intake Total  400 500


 


Balance  400 500


 


Weight 89.04 kg 86 kg 


 


Intake:   


 


  Oral  400 500


 


Other:   


 


  # Voids  3 1














- Exam








PHYSICAL EXAMINATION: 





GENERAL: The patient is alert and oriented x3, not in any acute distress. Well 

developed, well nourished. 


HEENT: Pupils are round and equally reacting to light. EOMI. No scleral icterus.

No conjunctival pallor. Normocephalic, atraumatic. No pharyngeal erythema. No 

thyromegaly. 


CARDIOVASCULAR: S1 and S2 present. No murmurs, rubs, or gallops. 


PULMONARY: Chest is clear to auscultation, no wheezing or crackles. 


ABDOMEN: Soft, nontender, nondistended, normoactive bowel sounds. No palpable 

organomegaly. 


MUSCULOSKELETAL: No joint swelling or deformity.


EXTREMITIES: No cyanosis, clubbing, or pedal edema. 


NEUROLOGICAL: Gross neurological examination did not reveal any focal deficits. 


SKIN: No rashes. 

















- Labs


CBC & Chem 7: 


                                 12/27/19 04:57





                                 12/27/19 04:57


Labs: 


                  Abnormal Lab Results - Last 24 Hours (Table)











  12/26/19 12/27/19 Range/Units





  18:50 04:57 


 


Glucose   162 H  (74-99)  mg/dL


 


POC Glucose (mg/dL)  128 H   (75-99)  mg/dL














Assessment and Plan


Plan: 


Accelerated hypertension and possibility of hypertensive emergency.  Patient 

blood pressures significantly come down with downward probably pressure too much

patient will be started on diuretic therapy and imbued will be continued  cardio

evaluated the patient


-Coronary artery disease with recent stent we'll rule out a concurrent syndromes

will be resumed on her medications for coronary artery disease


-Gastroesophageal reflux disease


-depression

## 2019-12-28 NOTE — P.PN
Subjective





this is a pleasant 65 principal female with past medical history of coronary 

artery disease status post stent placement, GERD, hypertension, liver disease.  

Presents because of high blood pressure.  She checked her blood pressure was 

more than 200 systolic.  On presentation her blood pressure was 209/107.at home 

she takes Cozaar 100 mg daily and Coreg 12.5 mg twice daily.which are continued 

here and chlorthalidone was added 50 mg daily and Imdur 30 mg daily.her blood 

pressure is running on the low side and currently 102/45.  Patient feels lousy. 

This morning patient felt chest pain on the left side radiating to the right 

side, sharp could not increase it by deep breathing or coughing.  It was 

significant in the morning but is down significantly with pain medication.


cardiologist evaluated the patient today and his but much concerned about her 

chest pain.  Serial troponins and EKG were unremarkable.however patient told me 

she still feels lousy.  No other complaints, no abdominal pain, no change in 

urine or bowel habits.  No nausea vomiting.  No leg pain.  No dyspnea.chest x-

ray from yesterday showed increasing interstitial density repeated we going to 

repeat chest x-ray








Review of systems


CONSTITUTIONAL: No fever, no malaise, no fatigue. 


HEENT: No recent visual problems or hearing problems. Denied any sore throat. 


CARDIOVASCULAR: No  orthopnea, PND, no palpitations, no syncope. 


PULMONARY: No shortness of breath, no cough, no hemoptysis. 


GASTROINTESTINAL: No diarrhea, no nausea, no vomiting, no abdominal pain. 

Normoactive bowel sounds. 


NEUROLOGICAL: No headaches, no weakness, no numbness. 


HEMATOLOGICAL: Denies any bleeding or petechiae. 


GENITOURINARY: Denies any burning micturition, frequency, or urgency. 


MUSCULOSKELETAL/RHEUMATOLOGICAL: Denies any joint pain, swelling, or any muscle 

pain. 


ENDOCRINE: Denies any polyuria or polydipsia.


                               Active Medications











Generic Name Dose Route Start Last Admin





  Trade Name Freq  PRN Reason Stop Dose Admin


 


Hydrocodone Bitart/Acetaminophen  2 each  12/26/19 17:29  12/28/19 12:16





  [place] 5-325  PO   2 each





  TID PRN   Administration





  Pain  


 


Aspirin  81 mg  12/27/19 09:00  12/28/19 08:34





  Aspirin  PO   Not Given





  DAILY [facility]  


 


Atorvastatin Calcium  80 mg  12/27/19 09:00  12/28/19 08:34





  Lipitor  PO   80 mg





  DAILY [facility]   Administration


 


Carvedilol  12.5 mg  12/26/19 17:30  12/28/19 06:34





  Coreg  PO   12.5 mg





  AC-BID [facility]   Administration


 


Chlorthalidone  25 mg  12/29/19 09:00 





  Hygroton  PO  





  DAILY [facility]  


 


Clopidogrel Bisulfate  75 mg  12/27/19 09:00  12/28/19 08:34





  Plavix  PO   75 mg





  DAILY [facility]   Administration


 


Heparin Sodium (Porcine)  5,000 unit  12/28/19 09:00  12/28/19 08:41





  Heparin  SQ   5,000 unit





  Q12HR [facility]   Administration


 


Hydrocortisone  1 applic  12/27/19 14:01  12/28/19 08:36





  Hydrocortisone 1% Cream  TOPICAL   1 applic





  TID PRN   Administration





  Skin Irritation  


 


Sodium Chloride  1,000 mls @ 20 mls/hr  12/26/19 18:15  12/27/19 17:52





  Saline 0.9%  IV   Not Given





  .Q24H [facility]  


 


Isosorbide Mononitrate  30 mg  12/27/19 09:30  12/28/19 08:34





  Imdur  PO   30 mg





  DAILY [facility]   Administration


 


Loratadine  10 mg  12/27/19 09:00  12/28/19 08:34





  Claritin  PO   10 mg





  DAILY [facility]   Administration


 


Losartan Potassium  100 mg  12/27/19 09:00  12/28/19 08:35





  Cozaar  PO   100 mg





  DAILY [facility]   Administration


 


Naloxone HCl  0.2 mg  12/26/19 18:04 





  Narcan  IV  





  Q2M PRN  





  Opioid Reversal  


 


Nitroglycerin  0.4 mg  12/26/19 17:22  12/28/19 06:41





  Nitrostat  SUBLINGUAL   0.4 mg





  Q5M PRN   Administration





  Chest Pain  


 


Ondansetron HCl  4 mg  12/26/19 18:04 





  Zofran  IVP  





  Q8HR PRN  





  Nausea And Vomiting  


 


Pantoprazole Sodium  40 mg  12/27/19 07:30  12/28/19 06:34





  Protonix  PO   40 mg





  AC-BRKFST [facility]   Administration


 


Sertraline HCl  100 mg  12/27/19 09:00  12/28/19 08:35





  Zoloft  PO   100 mg





  DAILY [facility]   Administration














Objective





- Vital Signs


Vital signs: 


                                   Vital Signs











Temp  97.7 F   12/28/19 12:00


 


Pulse  69   12/28/19 12:00


 


Resp  15   12/28/19 12:00


 


BP  109/97   12/28/19 12:00


 


Pulse Ox  92 L  12/28/19 12:00








                                 Intake & Output











 12/27/19 12/28/19 12/28/19





 18:59 06:59 18:59


 


Intake Total 500 500 500


 


Balance 500 500 500


 


Weight  87.5 kg 


 


Intake:   


 


  Oral 500 500 500


 


Other:   


 


  # Voids 1 1 1














- Exam





-GENERAL: The patient is alert and oriented x3, not in any acute distress. obese


HEENT: Pupils are round and equally reacting to light. EOMI. No scleral icterus.

No conjunctival pallor. Normocephalic, atraumatic. No pharyngeal erythema. No 

thyromegaly. 


-CARDIOVASCULAR: S1 and S2 present. No murmurs, rubs, or gallops. 


PULMONARY: Chest is clear to auscultation, no wheezing or crackles. no chest 

wall tenderness


ABDOMEN: Soft, nontender, nondistended, normoactive bowel sounds. No palpable 

organomegaly. 


MUSCULOSKELETAL: No joint swelling or deformity. 


EXTREMITIES: No cyanosis, clubbing, or pedal edema. 


NEUROLOGICAL: Gross neurological examination did not reveal any focal deficits. 


SKIN: No rashes. no petechiae.





- Labs


CBC & Chem 7: 


                                 12/27/19 04:57





                                 12/27/19 04:57





Assessment and Plan


Assessment: 





hypertensive urgency


Hypertension, uncontrolled


chest pain similar to her recent attack of coronary artery syndrome.recent 

History of coronary artery disease status post stent placement


GERD


History of liver disease





Plan: 





this is a pleasant 65 years old female who presents because of hypertensive 

urgency.  We'll continue with her home medication of Cozaar and Coreg.  We'll 

continue with him during 50 mg but we are going to lower the dose of 

chlorthalidonedown to 25 mg daily and continue the other medication.continue 

with cardiology follow-up recommendation.

## 2019-12-28 NOTE — PN
PROGRESS NOTE



Mrs. Rubin is a 65-year-old lady who underwent stenting of ostial RCA performed

sometime in November.  She came into the hospital with chest pain.  Pain is atypical.

This morning again, she had chest pain requiring Dilaudid.  EKG reveals nonspecific

mild EKG changes without evidence of ischemia.  No change from baseline.



Her vital signs are stable.  There is no JVD.  S1-S2 heard normally.  There is a short

systolic murmur at the base.  Second heart sound is preserved.  Lungs are clear.

Abdomen is soft.  Lower extremity pulses are diminished.



IMPRESSION:

This patient has history of coronary artery disease, stenting of right coronary artery

about 6 weeks ago.  She also has history of peripheral artery disease with previous

aortobifem surgery.



I am recommending that we could leave her on subcu heparin and perform additional

troponins.  The last troponin after her chest pain was normal.  If she has no further

chest pain, we can possibly discharged her in 24 hours.  Plan is to continue current

medical regimen.  Add subcu heparin.  Get additional troponin levels.  EKG was

unremarkable.





MMODL / IJN: [id number] / Job#: [id number]

## 2019-12-29 NOTE — PN
PROGRESS NOTE



Mrs. Rubin is doing well today.  She has no further chest pain.  Her creatinine is

somewhat elevated at 1.47.  Her troponins are normal.  Pain is atypical.



Vitals are stable. S1-S2 heard normally. Short systolic murmur noted.  Lungs reveal

diminished air entry.  Abdomen and lower extremity exam is unchanged.



I am recommending that we give her IV fluids.  Check BMP and magnesium level in the

morning.  Discontinue the diuretic that she is taking in the form of Hygroton and if

her electrolytes are better tomorrow, she can be discharged and see Dr. Palacios as an

outpatient.





MMODL / IJN: [id number] / Job#: [id number]

## 2019-12-29 NOTE — P.PN
Subjective





this is a pleasant 65 principal female with past medical history of coronary 

artery disease status post stent placement, GERD, hypertension, liver disease.  

Presents because of high blood pressure.  She checked her blood pressure was 

more than 200 systolic.  On presentation her blood pressure was 209/107.at home 

she takes Cozaar 100 mg daily and Coreg 12.5 mg twice daily.which are continued 

here and chlorthalidone was added 50 mg daily and Imdur 30 mg daily.her blood 

pressure is running on the low side and currently 102/45.  Patient feels lousy. 

This morning patient felt chest pain on the left side radiating to the right 

side, sharp could not increase it by deep breathing or coughing.  It was 

significant in the morning but is down significantly with pain medication.


cardiologist evaluated the patient today and his but much concerned about her 

chest pain.  Serial troponins and EKG were unremarkable.however patient told me 

she still feels lousy.  No other complaints, no abdominal pain, no change in 

urine or bowel habits.  No nausea vomiting.  No leg pain.  No dyspnea.chest x-

ray from yesterday showed increasing interstitial density repeated we going to 

repeat chest x-ray





12/29/2019


Patient is awake and alert, she gets out of bed easily to the restroom and 

coming back.  She still have some chest pain which is again similar to the chest

pain she had it about 6 weeks ago when she had a cardiac stent with little 

different in its radiation.  But it felt like the same.  Blood pressure is 

stable, currently 111/60.  Creatinine jumped up from 0.9-1.4, mostly related to 

diuretic which is stopped now.


Patient currently remains in the ICU but as selective overflow








Review of systems


CONSTITUTIONAL: No fever, no malaise, no fatigue. 


HEENT: No recent visual problems or hearing problems. Denied any sore throat. 


CARDIOVASCULAR: No  orthopnea, PND, no palpitations, no syncope. 


PULMONARY: No shortness of breath, no cough, no hemoptysis. 


GASTROINTESTINAL: No diarrhea, no nausea, no vomiting, no abdominal pain. 

Normoactive bowel sounds. 


NEUROLOGICAL: No headaches, no weakness, no numbness. 


HEMATOLOGICAL: Denies any bleeding or petechiae. 


GENITOURINARY: Denies any burning micturition, frequency, or urgency. 


MUSCULOSKELETAL/RHEUMATOLOGICAL: Denies any joint pain, swelling, or any muscle 

pain. 


ENDOCRINE: Denies any polyuria or polydipsia.


                                        


                               Active Medications











Generic Name Dose Route Start Last Admin





  Trade Name Freq  PRN Reason Stop Dose Admin


 


Hydrocodone Bitart/Acetaminophen  2 each  12/26/19 17:29  12/29/19 09:19





  [place] 5-325  PO   2 each





  TID PRN   Administration





  Pain  


 


Aspirin  81 mg  12/27/19 09:00  12/29/19 09:00





  Aspirin  PO   Not Given





  DAILY [facility]  


 


Atorvastatin Calcium  80 mg  12/27/19 09:00  12/29/19 09:19





  Lipitor  PO   80 mg





  DAILY [facility]   Administration


 


Carvedilol  12.5 mg  12/26/19 17:30  12/29/19 07:01





  Coreg  PO   12.5 mg





  AC-BID [facility]   Administration


 


Clopidogrel Bisulfate  75 mg  12/27/19 09:00  12/29/19 09:19





  Plavix  PO   75 mg





  DAILY [facility]   Administration


 


Heparin Sodium (Porcine)  5,000 unit  12/28/19 09:00  12/29/19 09:19





  Heparin  SQ   Not Given





  Q12HR [facility]  


 


Hydrocortisone  1 applic  12/27/19 14:01  12/28/19 08:36





  Hydrocortisone 1% Cream  TOPICAL   1 applic





  TID PRN   Administration





  Skin Irritation  


 


Sodium Chloride  1,000 mls @ 20 mls/hr  12/26/19 18:15  12/28/19 16:07





  Saline 0.9%  IV   Not Given





  .Q24H [facility]  


 


Sodium Chloride  1,000 mls @ 100 mls/hr  12/29/19 09:00  12/29/19 09:20





  Saline 0.9%  IV   100 mls/hr





  .Q10H [facility]   Administration


 


Isosorbide Mononitrate  30 mg  12/27/19 09:30  12/29/19 09:19





  Imdur  PO   30 mg





  DAILY [facility]   Administration


 


Loratadine  10 mg  12/27/19 09:00  12/29/19 09:19





  Claritin  PO   10 mg





  DAILY [facility]   Administration


 


Losartan Potassium  100 mg  12/27/19 09:00  12/29/19 09:23





  Cozaar  PO   100 mg





  DAILY [facility]   Administration


 


Naloxone HCl  0.2 mg  12/26/19 18:04 





  Narcan  IV  





  Q2M PRN  





  Opioid Reversal  


 


Nitroglycerin  0.4 mg  12/26/19 17:22  12/28/19 06:41





  Nitrostat  SUBLINGUAL   0.4 mg





  Q5M PRN   Administration





  Chest Pain  


 


Ondansetron HCl  4 mg  12/26/19 18:04 





  Zofran  IVP  





  Q8HR PRN  





  Nausea And Vomiting  


 


Pantoprazole Sodium  40 mg  12/27/19 07:30  12/29/19 07:00





  Protonix  PO   40 mg





  AC-BRKFST [facility]   Administration


 


Sertraline HCl  100 mg  12/27/19 09:00  12/29/19 09:23





  Zoloft  PO   100 mg





  DAILY [facility]   Administration














Objective





- Vital Signs


Vital signs: 


                                   Vital Signs











Temp  98.1 F   12/29/19 12:00


 


Pulse  57 L  12/29/19 12:00


 


Resp  20   12/29/19 12:00


 


BP  111/60   12/29/19 12:00


 


Pulse Ox  97   12/29/19 12:00








                                 Intake & Output











 12/28/19 12/29/19 12/29/19





 18:59 06:59 18:59


 


Intake Total 500 400 


 


Balance 500 400 


 


Weight  89.4 kg 


 


Intake:   


 


  Oral 500 400 


 


Other:   


 


  # Voids 1 2 














- Exam





-GENERAL: The patient is alert and oriented x3, not in any acute distress. obese


HEENT: Pupils are round and equally reacting to light. EOMI. No scleral icterus.

No conjunctival pallor. Normocephalic, atraumatic. No pharyngeal erythema. No 

thyromegaly. 


-CARDIOVASCULAR: S1 and S2 present. No murmurs, rubs, or gallops. 


PULMONARY: Chest is clear to auscultation, no wheezing or crackles. no chest 

wall tenderness


ABDOMEN: Soft, nontender, nondistended, normoactive bowel sounds. No palpable 

organomegaly. 


MUSCULOSKELETAL: No joint swelling or deformity. 


EXTREMITIES: No cyanosis, clubbing, or pedal edema. 


NEUROLOGICAL: Gross neurological examination did not reveal any focal deficits. 


SKIN: No rashes. no petechiae.





- Labs


CBC & Chem 7: 


                                 12/29/19 05:27





                                 12/29/19 05:25


Labs: 


                  Abnormal Lab Results - Last 24 Hours (Table)











  12/29/19 Range/Units





  05:25 


 


Carbon Dioxide  31 H  (22-30)  mmol/L


 


BUN  27 H  (7-17)  mg/dL


 


Creatinine  1.47 H  (0.52-1.04)  mg/dL


 


Glucose  112 H  (74-99)  mg/dL














Assessment and Plan


Assessment: 





hypertensive urgency.  Resolved


Hypertension, uncontrolled.  Currently is better controlled


Acute kidney injury, suspect direct side effects, hydrochlorothiazide was 

stopped.  Monitor renal function


chest pain similar to her recent attack of coronary artery syndrome.recent 

History of coronary artery disease status post stent placement


GERD


History of liver disease





Plan: 





this is a pleasant 65 years old female who presents because of hypertensive 

urgency.  We'll continue with her home medication of Cozaar and Coreg.  

Chlorthalidone was stopped for worsening.continue with cardiology follow-up 

recommendation.


Labs and medication were reviewed..  Continue same treatment.  Continue with 

symptomatic treatment.  Resume home medication.  Monitor lytes and vitals.  DVT 

and GI prophylaxis.  Further recommendations of the clinical course of the 

patient


DVT prophylaxis: Subcutaneous heparin


GI Prophylaxis: Pepcid


Prognosis is guarded

## 2019-12-30 NOTE — P.PN
Subjective


Progress Note Date: 12/30/19


This is a 65-year-old female with history of hypertension, coronary artery 

disease status post stent placement of the ostium of the right coronary artery 

who is admitted to the hospital with chest pain and fluctuating blood pressures.

 Her blood pressure still very labile.  She is having intermittent chest pain 

which appeared to be typical.  So far cardiac enzymes have been negative.  

Patient and patient's  want to talk to Dr. Aranda.  I did inform Dr. Aranda

and was planning to see her today.  Continue current medical therapy.  No acute 

cardiac events








Objective





- Vital Signs


Vital signs: 


                                   Vital Signs











Temp  97.6 F   12/30/19 12:00


 


Pulse  75   12/30/19 12:00


 


Resp  18   12/30/19 12:00


 


BP  138/79   12/30/19 12:00


 


Pulse Ox  93 L  12/30/19 12:00








                                 Intake & Output











 12/29/19 12/30/19 12/30/19





 18:59 06:59 18:59


 


Intake Total 600 1000 816


 


Balance 600 1000 816


 


Intake:   


 


   1000 


 


    Sodium Chloride 0.9% 1, 600 1000 





    000 ml @ 100 mls/hr IV .   





    Q10H [facility] Rx#:[id number]   


 


  Oral   816


 


Other:   


 


  # Voids 6 6 3


 


  # Bowel Movements 1  














- Exam


GENERAL EXAM: Patient is alert and oriented and doesn't appear to be in any 

acute distress


HEENT: Normocephalic. Normal reaction of pupils, equal size, normal range of 

extraocular motion. No erythema or exudates in the throat.


NECK: No masses, no nuchal rigidity.


CHEST: No chest wall deformity.


LUNGS: Equal air entry with no crackles or wheeze.


HEART: S1 and S2 normal with no audible mumurs or gallops. Regular rhythm, 

femorals equal on both sides..


ABDOMEN: No hepatosplenomegaly, normal bowel sounds, no guarding or rigidity.


SKIN: No rashes


CENTRAL NERVOUS SYSTEM: No focal deficits.


EXTREMITIES: No cyanosis, clubbing or edema.








- Labs


CBC & Chem 7: 


                                 12/29/19 05:27





                                 12/30/19 04:20


Labs: 


                  Abnormal Lab Results - Last 24 Hours (Table)











  12/30/19 Range/Units





  04:20 


 


Carbon Dioxide  31 H  (22-30)  mmol/L


 


BUN  22 H  (7-17)  mg/dL


 


Creatinine  1.16 H  (0.52-1.04)  mg/dL


 


Glucose  107 H  (74-99)  mg/dL














Assessment and Plan


(1) Uncontrolled hypertension


Current Visit: Yes   Status: Acute   Code(s): I10 - ESSENTIAL (PRIMARY) 

HYPERTENSION   SNOMED Code(s): 33329687


   





(2) Chest pain


Current Visit: No   Status: Acute   Code(s): R07.9 - CHEST PAIN, UNSPECIFIED   

SNOMED Code(s): 78842960


   





(3) Peripheral vascular disease


Current Visit: Yes   Status: Acute   Code(s): I73.9 - PERIPHERAL VASCULAR 

DISEASE, UNSPECIFIED   SNOMED Code(s): 356470216


   





(4) Hypercholesterolemia


Current Visit: Yes   Status: Acute   Code(s): E78.00 - PURE 

HYPERCHOLESTEROLEMIA, UNSPECIFIED   SNOMED Code(s): 26881806


   


Plan: 


Still having problems with  Labile hypertension and intermittent chest pains.  

Clinically appear to be atypical.  Waiting to be seen by Dr. Aranda

## 2019-12-30 NOTE — CONS
CONSULTATION



PULMONARY/CRITICAL CARE CONSULTATION:



DATE OF SERVICE:

12/30/2019



REASON FOR CONSULTATION:

Shortness of breath.



This is a 65-year-old female who was seen in the emergency room on 12/26/2019. We were

just consulted today for shortness of breath.  The patient states that when she exerts

herself, she becomes short of breath.  She apparently has seen my partner in the past.

She apparently was diagnosed as having COPD.  She has had a pulmonary function test,

the results of which I do not have in front of me.  In addition, she was found also to

have quite elevated blood pressure when she was seen in the emergency room.  Her

systolic blood pressure was greater than 200.  So her primary complaints were that of

shortness of breath on exertion and elevated blood pressure.  She denied any cough.  No

wheezing.  No tightness in her chest.  No nausea, vomiting, diarrhea.  No fever or

chills.  No phlegm production.  She apparently did have a headache.



The patient was seen in the emergency room today.  She has apparently not followed up

with Dr. Fishman as she should have.



She is currently not on any breathing medications, even though she was apparently

prescribed them in the past.



MEDICATIONS:

Her home medications are reviewed.  She is on Zyrtec, vitamin D3, iron, Protonix,

Zoloft, Coreg, Cozaar, aspirin, Lipitor, Plavix and sublingual nitroglycerin.



ALLERGIES:

Her allergy list is too long to dictate.  Needless to say, she apparently has ALLERGIES

to CORTISONE and ALBUTEROL amongst the number of other medications.  These two are

pointed out because they are typically respiratory medications.



PAST MEDICAL HISTORY:

Her past medical history is apparently positive for CAD, GERD, hypertension, chronic

liver disease, nerve damage, anemia, and some other minor medical problems, including

COPD as mentioned above.



SURGICAL HISTORY:

Her surgical history is positive for cholecystectomy, heart catheterization with stent,

hysterectomy, orthopedic procedures and tonsillectomy.  She has also had aortic surgery

by Dr. Hoffmann and has had a pain stimulator which has subsequently been removed.  She

has had a left femoral endarterectomy as well.



SOCIAL HISTORY:

Positive for previous tobacco use.  She smoked at least 30 years at one pack a day.

She denies any alcohol or illicit drug use.



FAMILY HISTORY:

Positive for mother with no health issues and father with CAD.



REVIEW OF SYSTEMS:

CONSTITUTIONAL: Negative.

NEUROLOGIC: Headache, improved.

HEENT: Negative.

CARDIOVASCULAR: Elevated blood pressure.

PULMONARY: Shortness of breath on exertion, chronic.

GI: Negative.

: Negative.

RHEUMATOLOGIC: Negative.

IMMUNOLOGIC: Negative.

ENDOCRINOLOGIC: Negative.

DERMATOLOGIC: Negative.



PHYSICAL EXAMINATION:

VITAL SIGNS: Current vital signs are reviewed. Temperature 97.6, heart rate 75,

respiratory rate 18, blood pressure 138/79 with mean of 98, room-air saturation 98%.

GENERAL APPEARANCE:  She appears in no acute distress. No audible wheezing.  No use of

accessory muscles.  No conversational dyspnea.

HEENT: HEENT examination is grossly unremarkable.  Mucous membranes are moist.

NECK:  Supple.  Full range of motion.  No adenopathy or thyromegaly.  Neck veins are

flat.

CARDIOVASCULAR: Cardiovascular examination reveals regular rhythm and rate.  Heart rate

in the low 70s.  S1, S2 normal.  No distinct murmur noted.

LUNGS:  Lungs reveal clear breath sounds.  No wheezes, rhonchi or crackles.  Breath

sounds are equal bilaterally.

ABDOMEN:  Obese.  Bowel sounds are heard.

EXTREMITIES: Intact.  No cyanosis, clubbing or edema.

SKIN: Without rash.

NEUROLOGIC: Neurologic examination is brief but nonfocal.



LABORATORY DATA/IMAGING:

Laboratory data includes a white count of 4.3, hemoglobin 12.7, hematocrit 37.1,

platelet count 175,000.  Sodium, potassium normal. Chloride is 103. CO2 31. BUN and

creatinine were 22 and 1.16.  Her N-terminal proBNP was only 49.  Her troponins were

negative x3.  Microbiologic studies are negative.



Chest x-ray initially on admission showed some mild interstitial changes which might be

consistent with mild interstitial edema, given her elevated blood pressure.  A

subsequent chest chest x-ray done on 12/28 was normal.



Medications are reviewed.  She is currently not on any breathing medications.



ASSESSMENT:

1. Dyspnea on exertion, likely related to her previous diagnosis of chronic

    obstructive pulmonary disease, by my partner.  I will have to look at pulmonary

    function testing on this patient when I return to the office.

2. Previous history of heavy tobacco use, although the patient quit a number of years

    back.

3. Hypertensive urgency/emergency with possible mild interstitial edema.

4. History of coronary artery disease.

5. History of gastroesophageal reflux disease.

6. History of hypertension.

7. History of previous heart catheterization with stent.

8. History of anemia.

9. History of previous multiple surgical procedures, including aortic surgery.



PLAN:

Currently, I would not provide the patient any medication for the breathing.  I will

review the pulmonary function test in the office.  She apparently has some sort of

issues or problems with short-acting beta agonists as well as corticosteroids.  She

does not appear to be any major distress.  We will have her follow up in the office

with, I believe, Dr. Fishman who saw her in the past.  No additional recommendations

are made.  Prognosis is guarded.





MMODL / IJN: [id number] / Job#: [id number]

## 2019-12-30 NOTE — P.PN
Subjective





this is a pleasant 65 principal female with past medical history of coronary 

artery disease status post stent placement, GERD, hypertension, liver disease.  

Presents because of high blood pressure.  She checked her blood pressure was 

more than 200 systolic.  On presentation her blood pressure was 209/107.at home 

she takes Cozaar 100 mg daily and Coreg 12.5 mg twice daily.which are continued 

here and chlorthalidone was added 50 mg daily and Imdur 30 mg daily.her blood 

pressure is running on the low side and currently 102/45.  Patient feels lousy. 

This morning patient felt chest pain on the left side radiating to the right 

side, sharp could not increase it by deep breathing or coughing.  It was 

significant in the morning but is down significantly with pain medication.


cardiologist evaluated the patient today and his but much concerned about her 

chest pain.  Serial troponins and EKG were unremarkable.however patient told me 

she still feels lousy.  No other complaints, no abdominal pain, no change in 

urine or bowel habits.  No nausea vomiting.  No leg pain.  No dyspnea.chest x-

ray from yesterday showed increasing interstitial density repeated we going to 

repeat chest x-ray





12/29/2019


Patient is awake and alert, she gets out of bed easily to the restroom and 

coming back.  She still have some chest pain which is again similar to the chest

pain she had it about 6 weeks ago when she had a cardiac stent with little 

different in its radiation.  But it felt like the same.  Blood pressure is 

stable, currently 111/60.  Creatinine jumped up from 0.9-1.4, mostly related to 

diuretic which is stopped now.


Patient currently remains in the ICU but as selective overflow





12/30/2019


Patient remains in the ICU as selective overflow.  Patient today her blood 

pressure was low by, this morning twice went above 200 in which patient was asym

ptomatic however her blood pressure is currently controlled at 138/79.  

Cardiology still following the patient.  Patient and her  at bedside for 

updated with the case, they were requesting to see pulmonologist Dr. Fishman 

before, patient is complaining of from cough and exertional dyspnea and they 

don't want to wait outpatient that's why they asked him to see a pulmonologist. 

Patient creatinine came back close to normal at 1.16, so it was stopped





Objective





- Vital Signs


Vital signs: 


                                   Vital Signs











Temp  97.6 F   12/30/19 12:00


 


Pulse  75   12/30/19 12:00


 


Resp  18   12/30/19 12:00


 


BP  138/79   12/30/19 12:00


 


Pulse Ox  93 L  12/30/19 12:00








                                 Intake & Output











 12/29/19 12/30/19 12/30/19





 18:59 06:59 18:59


 


Intake Total 600 1000 816


 


Balance 600 1000 816


 


Intake:   


 


   1000 


 


    Sodium Chloride 0.9% 1, 600 1000 





    000 ml @ 100 mls/hr IV .   





    Q10H TERESO Rx#:[id number]   


 


  Oral   816


 


Other:   


 


  # Voids 6 6 3


 


  # Bowel Movements 1  














- Exam





-GENERAL: The patient is alert and oriented x3, not in any acute distress. obese


HEENT: Pupils are round and equally reacting to light. EOMI. No scleral icterus.

No conjunctival pallor. Normocephalic, atraumatic. No pharyngeal erythema. No 

thyromegaly. 


-CARDIOVASCULAR: S1 and S2 present. No murmurs, rubs, or gallops. 


PULMONARY: Chest is clear to auscultation, no wheezing or crackles. no chest 

wall tenderness


ABDOMEN: Soft, nontender, nondistended, normoactive bowel sounds. No palpable 

organomegaly. 


MUSCULOSKELETAL: No joint swelling or deformity. 


EXTREMITIES: No cyanosis, clubbing, or pedal edema. 


NEUROLOGICAL: Gross neurological examination did not reveal any focal deficits. 


SKIN: No rashes. no petechiae.





- Labs


CBC & Chem 7: 


                                 12/29/19 05:27





                                 12/30/19 04:20


Labs: 


                  Abnormal Lab Results - Last 24 Hours (Table)











  12/30/19 Range/Units





  04:20 


 


Carbon Dioxide  31 H  (22-30)  mmol/L


 


BUN  22 H  (7-17)  mg/dL


 


Creatinine  1.16 H  (0.52-1.04)  mg/dL


 


Glucose  107 H  (74-99)  mg/dL














Assessment and Plan


Assessment: 





hypertensive urgency.  Resolved


Hypertension, uncontrolled.  Currently is better controlled


Acute kidney injury, suspect direct side effects, hydrochlorothiazide was 

stopped.  Improved


Patient is complaining of from cough and exertional dyspnea and wants to see 

pulmonologist


chest pain similar to her recent attack of coronary artery syndrome.recent 

History of coronary artery disease status post stent placement


GERD


History of liver disease





Plan: 





this is a pleasant 65 years old female who presents because of hypertensive 

urgency.  We'll continue with her home medication of Cozaar and Coreg.  

Chlorthalidone was stopped for worsening.continue with cardiology follow-up 

recommendation.  Consult pulmonary service


Labs and medication were reviewed..  Continue same treatment.  Continue with 

symptomatic treatment.  Resume home medication.  Monitor lytes and vitals.  DVT 

and GI prophylaxis.  Further recommendations of the clinical course of the 

patient


DVT prophylaxis: Subcutaneous heparin


GI Prophylaxis: Pepcid


Prognosis is guarded

## 2019-12-31 NOTE — P.PN
Subjective


Progress Note Date: 12/31/19


This is a 65-year-old female with history of hypertension, coronary artery 

disease status post stent placement of the ostium of the right coronary artery 

who is admitted to the hospital with chest pain and fluctuating blood pressures.

 Her blood pressure still very labile.  She is having intermittent chest pain 

which appeared to be typical.  So far cardiac enzymes have been negative.  

Patient and patient's  want to talk to Dr. Aranda.  I did inform Dr. Aranda

and was planning to see her today.  Continue current medical therapy.  No acute 

cardiac events.





12/31/2019: This patient who recently underwent stenting of the ostium of the 

right coronary artery by Dr. Aranda is admitted to the hospital complaining of 

chest pain, labile hypertension, not feeling well.  Her troponin and EKGs are 

normal.  Patient's to have intermittent atypical chest pain.  Patient wanted to 

be seen by Dr. Aranda.  Patient was seen by him last night.  I will discuss him 

regarding further management.  We'll continue current medical therapy.  Possible

discharge if Dr. Aranda's not planning any intervention.  Follow-up with him as 

an outpatient








Objective





- Vital Signs


Vital signs: 


                                   Vital Signs











Temp  97.7 F   12/31/19 04:00


 


Pulse  74   12/31/19 04:00


 


Resp  16   12/31/19 04:00


 


BP  154/79   12/31/19 04:00


 


Pulse Ox  93 L  12/30/19 23:34








                                 Intake & Output











 12/30/19 12/31/19 12/31/19





 18:59 06:59 18:59


 


Intake Total 816 400 


 


Balance 816 400 


 


Intake:   


 


  Oral 816 400 


 


Other:   


 


  # Voids 3 3 














- Exam


GENERAL EXAM: Patient is alert and oriented and doesn't appear to be in any 

acute distress


HEENT: Normocephalic. Normal reaction of pupils, equal size, normal range of 

extraocular motion. No erythema or exudates in the throat.


NECK: No masses, no nuchal rigidity.


CHEST: No chest wall deformity.


LUNGS: Equal air entry with no crackles or wheeze.


HEART: S1 and S2 normal with no audible mumurs or gallops. Regular rhythm, 

femorals equal on both sides..


ABDOMEN: No hepatosplenomegaly, normal bowel sounds, no guarding or rigidity.


SKIN: No rashes


CENTRAL NERVOUS SYSTEM: No focal deficits.


EXTREMITIES: No cyanosis, clubbing or edema.








- Labs


CBC & Chem 7: 


                                 12/29/19 05:27





                                 12/30/19 04:20





Assessment and Plan


(1) Uncontrolled hypertension


Current Visit: Yes   Status: Acute   Code(s): I10 - ESSENTIAL (PRIMARY) 

HYPERTENSION   SNOMED Code(s): 14590116


   





(2) Chest pain


Current Visit: No   Status: Acute   Code(s): R07.9 - CHEST PAIN, UNSPECIFIED   

SNOMED Code(s): 22293854


   





(3) Peripheral vascular disease


Current Visit: Yes   Status: Acute   Code(s): I73.9 - PERIPHERAL VASCULAR 

DISEASE, UNSPECIFIED   SNOMED Code(s): 487201009


   





(4) Hypercholesterolemia


Current Visit: Yes   Status: Acute   Code(s): E78.00 - PURE 

HYPERCHOLESTEROLEMIA, UNSPECIFIED   SNOMED Code(s): 04390723


   


Plan: 


Still having problems with  Labile hypertension and intermittent chest pains.  

Clinically appear to be atypical.  Waiting to be seen by Dr. Aranda





12/31/2019.  Patient is clinically stable with labile hypertension.  Evaluated 

by Dr. Aranda last night.  We will discuss with him and if no intervention is 

planned, patient could be discharged home

## 2019-12-31 NOTE — PN
PROGRESS NOTE



PULMONARY/CRITICAL CARE PROGRESS NOTE:



DATE OF SERVICE:

12/31/2019.



REASON FOR PROGRESS NOTE:

Shortness of breath/COPD.



This is a 65-year-old female who previously seen my partner, Dr. Fishman.  She

presented to the emergency room on December 26 for shortness of breath.  Her shortness

of breath occurs primarily on exertion.  She apparently was diagnosed with having COPD

in the past and has had pulmonary function test in the past.  She apparently was

prescribed inhalers, but does not currently use any inhalers.  In the emergency room,

she was found to have hypertensive urgency/emergency with a blood pressure of greater

than 200.  Anyway, the patient currently is doing much better.  She is resting

comfortably.  She is not receiving any supplemental oxygen.  She does not receive any

current IV fluid.  She apparently has not followed up in the recent past with Dr. Fishman.  She is currently feeling well.  As long as she is lying still or not doing

very much, her shortness of breath is not an issue.



I did tell her that I wanted her to see my partner again in the near future once

discharged. Her reason for admission on this admission with hypertensive

urgency/emergency with mild interstitial edema.



Current vital signs are reviewed. Temperature is 97.7, heart rate 74, respiratory rate

16, blood pressure 154/79 mean 104, 2 L saturation is 99%, room air saturation 95%.

Appears in no acute distress.

HEENT: Examination is grossly unremarkable.  No supplemental oxygen at this time of the

evaluation.

NECK:  Supple, full range of motion.  No adenopathy.  Neck veins are flat.

CARDIOVASCULAR: Examination reveals regular rhythm and rate.  S1, S2 normal.  No

murmur.

LUNGS:  Reveal mostly clear breath sounds.  A few scattered rhonchi.  No wheezes or

crackles.

ABDOMEN:  Soft.  Bowel sounds are heard.

EXTREMITIES:  Intact.  No cyanosis, clubbing, or edema.

SKIN: Without rash.

NEUROLOGIC: Examination is brief but nonfocal.



No new labs today to report.



Microbiology is negative.



No new chest x-ray to report.



Medications are reviewed.



ASSESSMENT:

1. Shortness of breath, on exertion, likely related to underlying chronic obstructive

    pulmonary disease. The patient will need follow up with my partner and possible re-

    initiation of inhalers.

2. Previous history of heavy tobacco use, quit.  Patient quit a number of years back.

3. Hypertensive urgency/emergency with mild interstitial edema, resolved.

4. History of coronary artery disease.

5. History of gastroesophageal reflux disease.

6. History of hypertension.

7. Previous heart catheterization with stent.

8. History of anemia.

9. History of previous multiple surgical procedures.



PLAN:

The patient is doing well.  Resting comfortably.  She has no complaints.  I told it was

important for her to follow with my partner.  She will.  She will likely need the

initiation of her inhalers again.  She may need an updated pulmonary function test.

Additional recommendations and suggestions are forthcoming.  Prognosis is guarded.

Will continue to follow.





MMODL / IJN: [id number] / Job#: [id number]

## 2019-12-31 NOTE — P.PN
Subjective





this is a pleasant 65 principal female with past medical history of coronary 

artery disease status post stent placement, GERD, hypertension, liver disease.  

Presents because of high blood pressure.  She checked her blood pressure was 

more than 200 systolic.  On presentation her blood pressure was 209/107.at home 

she takes Cozaar 100 mg daily and Coreg 12.5 mg twice daily.which are continued 

here and chlorthalidone was added 50 mg daily and Imdur 30 mg daily.her blood 

pressure is running on the low side and currently 102/45.  Patient feels lousy. 

This morning patient felt chest pain on the left side radiating to the right 

side, sharp could not increase it by deep breathing or coughing.  It was 

significant in the morning but is down significantly with pain medication.


cardiologist evaluated the patient today and his but much concerned about her 

chest pain.  Serial troponins and EKG were unremarkable.however patient told me 

she still feels lousy.  No other complaints, no abdominal pain, no change in 

urine or bowel habits.  No nausea vomiting.  No leg pain.  No dyspnea.chest x-

ray from yesterday showed increasing interstitial density repeated we going to 

repeat chest x-ray





12/29/2019


Patient is awake and alert, she gets out of bed easily to the restroom and 

coming back.  She still have some chest pain which is again similar to the chest

pain she had it about 6 weeks ago when she had a cardiac stent with little 

different in its radiation.  But it felt like the same.  Blood pressure is 

stable, currently 111/60.  Creatinine jumped up from 0.9-1.4, mostly related to 

diuretic which is stopped now.


Patient currently remains in the ICU but as selective overflow





12/30/2019


Patient remains in the ICU as selective overflow.  Patient today her blood 

pressure was low by, this morning twice went above 200 in which patient was asym

ptomatic however her blood pressure is currently controlled at 138/79.  

Cardiology still following the patient.  Patient and her  at bedside for 

updated with the case, they were requesting to see pulmonologist Dr. Fishman 

before, patient is complaining of from cough and exertional dyspnea and they 

don't want to wait outpatient that's why they asked him to see a pulmonologist. 

Patient creatinine came back close to normal at 1.16, so it was stopped





03/01/2019


Patient is in the ICU, as selective overflow.  She is clinically similar to 

yesterday and the last few days.  She still have intermittent chest pain.  Her 

cardiologist Dr. Aranda is going to evaluate the patient for possible cath other 

today or tomorrow.  Pulmonary input is appreciated.  Patient Babar looks stable








Objective





- Vital Signs


Vital signs: 


                                   Vital Signs











Temp  98.7 F   12/31/19 12:00


 


Pulse  66   12/31/19 12:00


 


Resp  18   12/31/19 12:00


 


BP  162/75   12/31/19 12:00


 


Pulse Ox  97   12/31/19 12:00








                                 Intake & Output











 12/30/19 12/31/19 12/31/19





 18:59 06:59 18:59


 


Intake Total 816 400 240


 


Balance 816 400 240


 


Intake:   


 


  Oral 816 400 240


 


Other:   


 


  # Voids 3 3 4


 


  # Bowel Movements   1














- Exam





-GENERAL: The patient is alert and oriented x3, not in any acute distress. obese


HEENT: Pupils are round and equally reacting to light. EOMI. No scleral icterus.

No conjunctival pallor. Normocephalic, atraumatic. No pharyngeal erythema. No 

thyromegaly. 


-CARDIOVASCULAR: S1 and S2 present. No murmurs, rubs, or gallops. 


PULMONARY: Chest is clear to auscultation, no wheezing or crackles. no chest 

wall tenderness


ABDOMEN: Soft, nontender, nondistended, normoactive bowel sounds. No palpable 

organomegaly. 


MUSCULOSKELETAL: No joint swelling or deformity. 


EXTREMITIES: No cyanosis, clubbing, or pedal edema. 


NEUROLOGICAL: Gross neurological examination did not reveal any focal deficits. 


SKIN: No rashes. no petechiae.





- Labs


CBC & Chem 7: 


                                 12/29/19 05:27





                                 12/30/19 04:20





Assessment and Plan


Assessment: 





hypertensive urgency.  Resolved


Hypertension, uncontrolled.  Currently is better controlled


Acute kidney injury, suspect direct side effects, hydrochlorothiazide was 

stopped.  Improved


Patient is complaining of from cough and exertional dyspnea and wants to see 

pulmonologist


chest pain similar to her recent attack of coronary artery syndrome.recent 

History of coronary artery disease status post stent placement


GERD


History of liver disease





Plan: 





this is a pleasant 65 years old female who presents because of hypertensive 

urgency.  We'll continue with her home medication of Cozaar and Coreg.  

Chlorthalidone was stopped for worsening.continue with cardiology follow-up 

recommendation.  Consult pulmonary service


Labs and medication were reviewed..  Continue same treatment.  Continue with 

symptomatic treatment.  Resume home medication.  Monitor lytes and vitals.  DVT 

and GI prophylaxis.  Further recommendations of the clinical course of the 

patient


DVT prophylaxis: Subcutaneous heparin


GI Prophylaxis: Pepcid


Prognosis is guarded

## 2020-01-01 NOTE — P.PN
Subjective





Patient is resting flat in bed.  She is a bit groggy on account of IV sedatives 

used


She denies any chest discomfort


Blood pressure is well controlled


Heart sounds.  Normal no murmurs or gallops or rub


Breath sounds are clear no rhonchi no crackles


No JVD


No lower extremity edema


Groin is healing well she just had a coronary angiogram which showed no 

significant progression of coronary artery disease, by Dr. Aranda





Plan


Continue present therapy


Continue cardiac medications


May go to [address] [place] [address]





Objective





- Vital Signs


Vital signs: 


                                   Vital Signs











Temp  97.7 F   01/01/20 08:00


 


Pulse  65   01/01/20 08:00


 


Resp  20   01/01/20 08:00


 


BP  134/79   01/01/20 08:00


 


Pulse Ox  91 L  01/01/20 08:00








                                 Intake & Output











 12/31/19 01/01/20 01/01/20





 18:59 06:59 18:59


 


Intake Total 240 200 100


 


Balance 240 200 100


 


Weight  88.1 kg 


 


Intake:   


 


  IV   100


 


  Oral 240 200 


 


Other:   


 


  # Voids 4 1 


 


  # Bowel Movements 1  














- Labs


CBC & Chem 7: 


                                 01/01/20 04:18





                                 01/01/20 04:18


Labs: 


                  Abnormal Lab Results - Last 24 Hours (Table)











  01/01/20 Range/Units





  04:18 


 


Carbon Dioxide  31 H  (22-30)  mmol/L


 


BUN  21 H  (7-17)  mg/dL


 


Creatinine  1.19 H  (0.52-1.04)  mg/dL


 


Glucose  123 H  (74-99)  mg/dL

## 2020-01-01 NOTE — PN
PROGRESS NOTE



DATE OF SERVICE:

01/01/2020



This 65-year-old woman who was admitted with hypertensive urgency also had acute kidney

injury.  The patient is also complaining of chest pain which is felt in the anterior

part of the chest and Dr. Palacios performed a cardiac catheterization today which showed

patent stent in the ostial right RCA and mild disease involving the left coronary

artery and medical treatment was noted and the patient is being closely monitored.  No

chest pain.  No palpitations.



PAST MEDICAL HISTORY:

Reviewed.



REVIEW OF SYSTEMS:

CARDIOVASCULAR SYSTEM: As mentioned earlier.

RESPIRATORY: As mentioned earlier.

GI:  No nausea.

: No dysuria or retention.



CURRENT MEDICATIONS:

1. Norco 5 mg t.i.d. p.r.n.

2. Aspirin 81 mg.

3. Lipitor 80 mg.

4. Symbicort 4.5 b.i.d.

5. Coreg.

6. Plavix.

7. Pepcid.

8. Heparin.

9. Atrovent.

10.Imdur.

11.Claritin.

12.Cozaar.

13.Narcan.

14.Nitrostat.

15.Protonix.

16.Zoloft.



PHYSICAL EXAMINATION:

Alert and oriented x3.  Pulse is 75, blood pressure 130/103, respiration 11,

temperature 97.8, pulse ox 98% on 4 L.

HEENT:  Conjunctivae normal. Oral mucosa moist.

NECK:  No jugular venous distention.  No lymph node enlargement.

CARDIOVASCULAR:  S1, S2.

RESPIRATORY: Diminished breath sounds at the bases. A few scattered rhonchi and

crackles.

ABDOMEN: Soft, nontender.

LEGS: No edema, no swelling.

NERVOUS SYSTEM: No focal deficits.



LABS:

CBC within normal limits.  Creatinine is 1.19.



ASSESSMENT:

1. Hypertensive urgency and uncontrolled hypertension, present on admission.

2. Chest pain, possible unstable angina status post cardiac catheterization showing

    mild disease of the left coronary system and patent stent in the ostial right right

    coronary artery.

3. Chronic obstructive pulmonary disease acute exacerbation with acute purulent

    tracheobronchitis.

4. Acute kidney injury, possible acute tubular necrosis, possibly secondary to

    medications.

5. Gastroesophageal reflux disease.

6. Increased creatinine with possible acute renal failure, acute tubular necrosis.

7. History chronic liver disease.

8. History of coronary artery disease, stent.

9. History of aortic surgery.

10.History of pain stimulator removal.

11.History of depression.

12.Obesity with body mass index 37.9.

13.Remote history of nicotine dependence.

14.FULL CODE.



RECOMMENDATIONS AND DISCUSSION:

In this 65-year-old woman who presented with multiple complex medical issues, we will

monitor the patient closely, continue the current medication and symptomatic treatment.

I recommend repeat labs.  Continue the beta blockers.  Continue with bronchodilators.

The chest x-ray which was personally reviewed by me showed no other medical issues.

COPD exacerbation was also noted. Overall prognosis guarded because of multiple complex

medical issues. Further recommendations to follow.





MMODL / IJN: [id number] / Job#: [id number]

## 2020-01-01 NOTE — CC
CARDIAC CATHETERIZATION REPORT



DATE OF SERVICE:

January 1, 2020.



PERFORMING PHYSICIAN:

Chris Palacios MD.



PROCEDURE PERFORMED:

1. Selective right and left coronary angiogram.

2. Left heart catheterization.



INDICATION:

This is a pleasant 65-year-old patient with coronary artery disease and known stenting

of the ostial right coronary artery who was admitted to the hospital with chest

discomfort and uncontrolled blood pressure.  Because she continues to be symptomatic in

term of chest pain, heart catheterization was advised.



APPROACH:

Right common femoral artery.



COMPLICATION:

None.



LEVEL OF SEDATION:

Moderate with sedation length of 14 minutes.



PROCEDURE DESCRIPTION:

After obtaining informed consent, the patient was brought to cardiac cath lab.  The

right common femoral artery was cannulated using micropuncture technique.  The

micropuncture wire passed easily.  Then I placed a 6-Latv[place] sheath.  After that I did

selective right and left coronary angiogram using Isak Kelley posterior for the

right and J L4 for the left.  Subsequently I did a left heart catheterization using 6-

[place]vian pigtail catheter.  The procedure was completed without any complication.



SELECTIVE CORONARY ANGIOGRAM:

1. The right coronary artery is a large caliber vessel and it is a dominant vessel.

    The RCA is stented in the ostial portion and the stent is patent.  The mid and

    distal RCA appeared to be angiographically normal.

2. The left main is angiographically normal.  It bifurcates into left circumflex and

    left anterior descending artery.

3. The left circumflex is a medium caliber vessel and nondominant vessel.  The left

    circumflex appeared to be angiographically normal.  It gives rise in the proximal

    portion into first and second obtuse marginal branches, both appeared to be

    angiographically normal.

4. The LAD:  The proximal LAD has mild disease only by the bifurcation of the large

    first diagonal branch.  The mid and distal LAD appeared to be angiographically

    normal.  The LAD in the midportion gives rise into second diagonal branch which

    seems to be normal.



HEMODYNAMICS:

The LVEDP was 14-15 mmHg without significant gradient across aortic valve.



CONCLUSION:

1. Patent stent in the ostial right coronary artery.

2. Mild disease involving the left coronary system.



POSTPROCEDURE MANAGEMENT:

1. Medical treatment.

2. Follow up with the patient.





MMODL / IJN: [id number] / Job#: [id number]

## 2020-01-01 NOTE — P.PN
Subjective


Progress Note Date: 01/01/20


Principal diagnosis: 





Acute exacerbation of chronic obstructive pulmonary disease.  Hypertensive 

urgency.





The patient is seen today 01/01/2020 in the intensive care unit.  She is c

urrently awake and alert in no acute distress.  She is maintaining O2 

saturations in the 90s on room air.  No current IV fluids.  The plan is for 

cardiac catheterization today.  She denies any worsening shortness of breath, 

cough or congestion.  No chest pain or palpitations.  Blood pressure better 

controlled today.  She's afebrile.  White count 5.3.  Hemoglobin 14.2.  

Creatinine 1.19.  Sodium 139.  Potassium 4.6.  





Objective





- Vital Signs


Vital signs: 


                                   Vital Signs











Temp  97.7 F   01/01/20 08:00


 


Pulse  65   01/01/20 08:00


 


Resp  20   01/01/20 08:00


 


BP  134/79   01/01/20 08:00


 


Pulse Ox  91 L  01/01/20 08:00








                                 Intake & Output











 12/31/19 01/01/20 01/01/20





 18:59 06:59 18:59


 


Intake Total 240 200 100


 


Balance 240 200 100


 


Weight  88.1 kg 


 


Intake:   


 


  IV   100


 


  Oral 240 200 


 


Other:   


 


  # Voids 4 1 


 


  # Bowel Movements 1  














- Exam





GENERAL EXAM: Alert, pleasant 65-year-old female patient, on room air, 

comfortable in no apparent distress.


HEAD: Normocephalic.


EYES: Normal reaction of pupils, equal size.


NOSE: Clear with pink turbinates.


THROAT: No erythema or exudates.


NECK: No masses, no JVD.


CHEST: No chest wall deformity.


LUNGS: Equal air entry with few scattered rhonchi, end expiratory wheeze.  

Diminished.


CVS: S1 and S2 normal with no audible murmur, regular rhythm.


ABDOMEN: No hepatosplenomegaly, normal bowel sounds, no guarding or rigidity.


SPINE: No scoliosis or deformity


SKIN: No rashes


CENTRAL NERVOUS SYSTEM: No focal deficits, tone is normal in all 4 extremities.


EXTREMITIES: There is no peripheral edema.  No clubbing, no cyanosis.  

Peripheral pulses are intact.





- Labs


CBC & Chem 7: 


                                 01/01/20 04:18





                                 01/01/20 04:18


Labs: 


                  Abnormal Lab Results - Last 24 Hours (Table)











  01/01/20 Range/Units





  04:18 


 


Carbon Dioxide  31 H  (22-30)  mmol/L


 


BUN  21 H  (7-17)  mg/dL


 


Creatinine  1.19 H  (0.52-1.04)  mg/dL


 


Glucose  123 H  (74-99)  mg/dL














Assessment and Plan


Assessment: 





1 Hypertensive urgency with intermittent chest pain





2 Acute exacerbation of chronic obstructive pulmonary disease





3 History of heavy tobacco dependence





4 History of coronary disease with stent placement





5 Gastric esophageal reflux disease





6 Hypertension





7 History of anemia





Plan:





The patient was seen and evaluated by Dr. Barros.  We did add Symbicort and 

ipratropium bromide inhalations.  She is going down for cardiac catheterization 

today.  We'll await results and make further recommendations based on her 

clinical status.





I, the cosigning physician, performed a history & physical examination of the 

patient. Lungs sounds with few scattered rhonchi, end expiratory wheeze, 

diminished Maintaining good O2 saturations in the 90s on room air.  I discussed 

the assessment and plan of care with my nurse practitioner, Velia Short. I attest

to the above note as dictated by her.

## 2020-01-02 NOTE — P.PN
Subjective


Progress Note Date: 01/02/20


This is a 65-year-old female with history of hypertension, coronary artery 

disease status post stent placement of the ostium of the right coronary artery 

who is admitted to the hospital with chest pain and fluctuating blood pressures.

 Her blood pressure still very labile.  She is having intermittent chest pain 

which appeared to be typical.  So far cardiac enzymes have been negative.  

Patient and patient's  want to talk to Dr. Aranda.  I did inform Dr. Aranda

and was planning to see her today.  Continue current medical therapy.  No acute 

cardiac events.





12/31/2019: This patient who recently underwent stenting of the ostium of the 

right coronary artery by Dr. Aranda is admitted to the hospital complaining of 

chest pain, labile hypertension, not feeling well.  Her troponin and EKGs are 

normal.  Patient's to have intermittent atypical chest pain.  Patient wanted to 

be seen by Dr. Aranda.  Patient was seen by him last night.  I will discuss him 

regarding further management.  We'll continue current medical therapy.  Possible

discharge if Dr. Aranda's not planning any intervention.  Follow-up with him as 

an outpatient.





01/02/2019: This patient with history of hypertension and coronary artery 

disease with a stent placement of the ostium of the right coronary artery who is

admitted to the hospital with recurrent chest pains and also fluctuating blood 

pressure.  Patient had a cardiac catheterization by Dr. Aranda yesterday.  She 

was found to have patent stent in the RCA.  Maximum medical therapy is advised. 

Her groin is soft.  Patient is clinically stable.  Patient could be discharged 

home from cardiac standpoint.  Follow-up with Dr. Aranda








Objective





- Vital Signs


Vital signs: 


                                   Vital Signs











Temp  97.3 F L  01/02/20 08:00


 


Pulse  70   01/02/20 08:00


 


Resp  13   01/02/20 08:00


 


BP  148/85   01/02/20 08:00


 


Pulse Ox  91 L  01/02/20 08:00








                                 Intake & Output











 01/01/20 01/02/20 01/02/20





 18:59 06:59 18:59


 


Intake Total 550  0


 


Output Total 155  


 


Balance 395  0


 


Weight  89 kg 89 kg


 


Intake:   


 


    


 


  Intake, IV Titration 450  0





  Amount   


 


    Sodium Chloride 0.9% 1, 450  0





    000 ml @ 75 mls/hr IV .   





    [id number] [facility] Rx#:[id number]   


 


Output:   


 


  Urine 155  


 


Other:   


 


  # Voids  2 1














- Exam


GENERAL EXAM: Patient is alert and oriented and doesn't appear to be in any 

acute distress


HEENT: Normocephalic. Normal reaction of pupils, equal size, normal range of 

extraocular motion. No erythema or exudates in the throat.


NECK: No masses, no nuchal rigidity.


CHEST: No chest wall deformity.


LUNGS: Equal air entry with no crackles or wheeze.


HEART: S1 and S2 normal with no audible mumurs or gallops. Regular rhythm, 

femorals equal on both sides..


ABDOMEN: No hepatosplenomegaly, normal bowel sounds, no guarding or rigidity.


SKIN: No rashes


CENTRAL NERVOUS SYSTEM: No focal deficits.


EXTREMITIES: No cyanosis, clubbing or edema.


GROIN: Soft without any hematoma








- Labs


CBC & Chem 7: 


                                 01/01/20 04:18





                                 01/01/20 04:18





Assessment and Plan


(1) Uncontrolled hypertension


Current Visit: Yes   Status: Acute   Code(s): I10 - ESSENTIAL (PRIMARY) 

HYPERTENSION   SNOMED Code(s): 45389212


   





(2) Chest pain


Current Visit: No   Status: Acute   Code(s): R07.9 - CHEST PAIN, UNSPECIFIED   

SNOMED Code(s): 77218802


   





(3) Peripheral vascular disease


Current Visit: Yes   Status: Acute   Code(s): I73.9 - PERIPHERAL VASCULAR 

DISEASE, UNSPECIFIED   SNOMED Code(s): 107487927


   





(4) Hypercholesterolemia


Current Visit: Yes   Status: Acute   Code(s): E78.00 - PURE HYPERCHOLES

TEROLEMIA, UNSPECIFIED   SNOMED Code(s): 37112352


   


Plan: 


Patient is status post cardiac catheterization for evaluation of chest pains.  

Was found to have patent stent in the RCA.  Patient is  clinically stable.  

Patient could be discharged home from cardiac standpoint.

## 2020-01-02 NOTE — PN
PROGRESS NOTE



DATE OF SERVICE:

01/02/2020



This is a 65-year-old woman who was admitted with hypertensive urgency, also had acute

kidney injury.  The patient also had chest pain.  However, cardiac catheterization

showed only mild coronary artery disease.  Patient being closely monitored at this

time.  The patient is still complaining of tiredness and weakness.  Multiple

consultants are following the patient closely.



PAST MEDICAL HISTORY:

Reviewed.



REVIEW OF SYMPTOMS:

Cardiovascular: As mentioned earlier.

Respiratory:  As mentioned earlier.

GI:  As mentioned earlier.

: No dysuria.

CENTRAL NERVOUS SYSTEM: No numbness or weakness.



CURRENT MEDICATIONS:

Reviewed and include:

1. Norco 5 mg t.i.d. p.r.n.

2. Aspirin 81 mg.

3. Lipitor 80 mg daily.

4. Symbicort 160/4.5 two puffs b.i.d.

5. Coreg 12.5 mg b.i.d.

6. Plavix 75 mg p.o. daily.

7. Robitussin.

8. Heparin.

9. Hydrocortisone.

10.Albuterol nebulizer.

11.Claritin.

12.Cozaar.

13.Narcan.

14.Nitrostat.

15.Zofran.

16.Protonix.

17.Zoloft.



PHYSICAL EXAM:

Patient is alert, oriented x2.  Pulse is 77, blood pressure is 149/62, respiration 26,

temperature 98.1, pulse ox 98% on room air.

HEENT: Conjunctivae normal.

NECK: No JVD.

CARDIOVASCULAR: S1, S2 muffled.

RESPIRATORY: Breath sounds diminished in the bases.  Bilateral scattered rhonchi and

crackles.  Expiratory wheezing also present.

ABDOMEN:  Soft, nontender.

LEGS are no edema. No swelling.

CENTRAL NERVOUS SYSTEM: No focal deficits.



LABS:

CBC within normal  limits.  Sodium 130, potassium 4.6.  Creatinine is 1.19.



ASSESSMENT:

1. Hypertensive urgency, uncontrolled hypertension, present on admission.

2. Chest pain possible unstable angina status post cardiac catheterization showing

    mild disease of the left coronary system and patent stent in the ostium of the

    right coronary artery.

3. Chronic obstructive pulmonary disease acute exacerbation with acute purulent

    tracheobronchitis.

4. Acute kidney injury, possible acute tubular necrosis, possibly secondary to

    medications.

5. Gastroesophageal reflux disease.

6. Increased creatinine with possible acute renal failure, acute tubular necrosis.

7. History of chronic liver disease.

8. History of coronary artery disease/stent.

9. History of aortic surgery.

10.History of pain stimulator removal.

11.History of depression.

12.Obesity with body mass index of 37.9.

13.Remote history of nicotine dependence.

14.FULL CODE.



RECOMMENDATIONS AND DISCUSSION:

In this 65-year-old woman who presented with multiple complex medical issues, we will

monitor the patient closely.  Continue the current medications, management symptomatic

treatment. Otherwise,  as this time, I recommend to continue with current medications,

symptomatic treatment.  Increase ambulation.  We will continue with bronchodilators.

The most recent chest x-ray done on 12/28 was reviewed by me which showed some

increased bronchovascular markings.  Further recommendations to follow.  I will repeat

a chest x-ray to ensure stability because of the patient's continued persistent

symptoms at this time.  Discussed with the patient and  at the bedside.

Understands and agrees. Further recommendations to follow.





LINNETTE / MICKEYN: [id number] / Job#: [id number]

## 2020-01-02 NOTE — P.PN
Subjective


Progress Note Date: 01/02/20


Principal diagnosis: 


 Acute exacerbation of chronic obstructive pulmonary disease, hypertensive 

urgency





 On 01/02/2020 patient is seen in follow-up in intensive care unit, she is awake

and alert, oriented 3, no acute distress, denies any shortness of breath, 

patient is on room air, no running IVs, patient had a cardiac catheterization 

yesterday which showed in-stent osteal right coronary artery, and mild disease 

involving the left coronary system.  No hemodynamic significant stenosis.  

Patient is on a combination of beta blockers, Lipitor, Cozaar.  She is receiving

breathing treatments, no significant cough or congestion, lung sounds revealed 

fine crackles at the left lower base, no rhonchi, no wheezing.  Patient still 

has reproducible left chest pain, no significant distress.  No acute events 

overnight, less chest x-ray was done on 12/20/2019 showing no acute process.  

Today's labs have been reviewed showing normal CBC, correlation profile was 

within normal limits, electrolytes were within normal limits with the exception 

of CO2 which is a 31, BUN is 21 creatinine is 1.19.  No nausea, vomiting or 

diarrhea, patient is tolerating oral intake.








Objective





- Vital Signs


Vital signs: 


                                   Vital Signs











Temp  98.5 F   01/02/20 04:00


 


Pulse  74   01/02/20 07:35


 


Resp  11 L  01/02/20 04:00


 


BP  118/63   01/02/20 04:00


 


Pulse Ox  95   01/02/20 04:00








                                 Intake & Output











 01/01/20 01/02/20 01/02/20





 18:59 06:59 18:59


 


Intake Total 550  


 


Output Total 155  


 


Balance 395  


 


Weight  89 kg 


 


Intake:   


 


    


 


  Intake, IV Titration 450  





  Amount   


 


    Sodium Chloride 0.9% 1, 450  





    000 ml @ 75 mls/hr IV .   





    [id number] [facility] Rx#:[id number]   


 


Output:   


 


  Urine 155  


 


Other:   


 


  # Voids  2 














- Exam


 GENERAL EXAM: Alert, very pleasant, 65-year-old white female, on room air, 

comfortable in no apparent distress.


HEAD: Normocephalic/atraumatic.


EYES: Normal reaction of pupils, equal size.  Conjunctiva pink, sclera white.


NOSE: Clear with pink turbinates.


THROAT: No erythema or exudates.


NECK: No masses, no JVD, no thyroid enlargement, no adenopathy.


CHEST: No chest wall deformity.  Symmetrical expansion. 


LUNGS: Equal air entry with fine crackles left lower base, wheeze, rhonchi or 

dullness.


CVS: Regular rate and rhythm, normal S1 and S2, no gallops, no murmurs, no rubs


ABDOMEN: Soft, nontender.  No hepatosplenomegaly, normal bowel sounds, no 

guarding or rigidity.


EXTREMITIES: No clubbing, no edema, no cyanosis, 2+ pulses and upper and lower 

extremities.


MUSCULOSKELETAL: Muscle strength and tone normal.


SPINE: No scoliosis or deformity


SKIN: No rashes


CENTRAL NERVOUS SYSTEM: Alert and oriented -3.  No focal deficits, tone is 

normal in all 4 extremities.


PSYCHIATRIC: Alert and oriented -3.  Appropriate affect.  Intact judgment and 

insight.











- Labs


CBC & Chem 7: 


                                 01/01/20 04:18





                                 01/01/20 04:18





Assessment and Plan


Plan: 


 Assessment:





1 Hypertensive urgency with intermittent chest pain





2 Acute exacerbation of chronic obstructive pulmonary disease





3 History of heavy tobacco dependence





4 History of coronary disease with stent placement





5 Gastric esophageal reflux disease





6 Hypertension





7 History of anemia





Plan:





Patient is stable to transfer out of the intensive care unit today to general 

medical floor, continue medical treatment, continue nebulized bronchodilators 

and Symbicort, no significant cough or congestion, lung sounds are stable, no 

fever or chills, today's labs have been reviewed,  chest x-ray showed no acute p

rocess.  Creased activity as tolerated, encourage deep breathing and coughing 

and assess spirometer use.





I performed a history & physical examination of the patient and discussed their 

management with my nurse practitioner, Jeannine Belcher.  I reviewed the nurse 

practitioner's note and agree with the documented findings and plan of care.  

Lung sounds are positive for fine rales at left lower base.  The findings and 

the impression was discussed with the patient.  I attest to the documentation by

the nurse practitioner. 








Time with Patient: Less than 30

## 2020-01-02 NOTE — XR
EXAMINATION TYPE: XR chest 1V portable

 

DATE OF EXAM: 1/2/2020

 

COMPARISON: 12/28/2019

 

HISTORY: Short of breath

 

TECHNIQUE: Single view

 

FINDINGS: Heart is normal. Lungs are clear. There is no heart failure. Costophrenic angles are clear.
 There are no hilar masses. Bony thorax is intact.

 

IMPRESSION: No active cardiopulmonary disease. Normal heart. No change.

## 2020-01-03 NOTE — P.DS
Providers


Date of admission: 


12/26/19 18:04





Expected date of discharge: 01/03/20


Attending physician: 


Melvin Guo





Consults: 





                                        





12/26/19 18:05


Consult Physician Routine 


   Consulting Provider: Chris Palacios


   Consult Reason/Comments: chest pain


   Do you want consulting provider notified?: Yes





12/30/19 12:01


Consult Physician Routine 


   Consulting Provider: Nigel Barros


   Consult Reason/Comments: exertional dsypnea, cough


   Do you want consulting provider notified?: Yes











Primary care physician: 


[facility]





Hospital Course: 





final diagnosis





Hypertensive urgency, uncontrolled hypertension, present on admission


Chest pain, possible unstable angina status post cardiac catheterization showing

mild disease of the left coronary system and patent stent in the ostium of the 

right coronary artery


Chronic obstructive pulmonary disease, acute exacerbation with acute purulent 

tracheobronchitis bronchitis


Acute kidney injury, possible acute tubular necrosis, possibly secondary to medi

cations


Gastroesophageal reflux disease


Increased creatinine with possible acute renal failure, acute tubular necrosis


History of chronic liver disease


History of coronary artery disease/stent


History of aortic surgery


History of pain stimulator removal


History of depression [place] obesity with a BMI of 37.9


Remote history of nicotine dependence


Full code





Discharge disposition





Patient is being discharged in a stable condition with guarded prognosis to home

and will follow up with Dr. Dillard on Monday. Patient will follow up at the

[facility] upon discharge. Total time taken is 35 minutes.





History of present illness


This is a 65 year old female who was recently admitted for chest pain, 

hypertensive urgency, and acute kidney injury and was being closely monitored. 

During hospitalization patient underwent cardiac catheterization showing patent 

stenting in the ostium of the RCA that was done previously with mild coronary 

artery disease. Patient continued to have chest pain and was found to have a 

slightly elevated d dimer of .66. VQ scan of the lungs were done showing low 

probability of PE. Currently patient denies any chest pain, shortness of breath,

or palpitations. Patient is afebrile. Patient denies any nausea or vomiting and 

is tolerating diet. Patient will be following up with Dr. Dillard on Monday January 6, 2020. Currently patients condition is stable and ready for discharge.

Guarded prognosis. 





On exam vital signs are stable. Temp is  97.9 F, pulse is 77  , resp are  16  , 

blood pressure is   117/69  , pulse ox is 94% on room air. Cardio s1,s2 are 

muffled. Respiratory system shows diminished breath sounds at the bases with a 

few scattered crackles noted. Mild expiratory wheezing noted as well. Abdomen is

soft and non-tender. Nervous system shows no focal deficits. 








Patient Condition at Discharge: Fair





Plan - Discharge Summary


Discharge Rx Participant: Yes


New Discharge Prescriptions: 


New


   Isosorbide Mononitrate ER [Imdur] 30 mg PO DAILY 30 Days #30 tab.er.24h


   HYDROcodone/APAP 5-325MG [Norco 5-325] 2 each PO TID PRN #12 tab


     PRN Reason: Pain





Continue


   Sertraline [Zoloft] 100 mg PO DAILY


   Ferrous Sulfate [Iron (65 MG Elemental)] 325 mg PO DAILY


   Cholecalciferol [Vitamin D3 (25 Mcg = 1000 Iu)] 1,000 unit PO DAILY


   Pantoprazole Sodium [Protonix] 40 mg PO DAILY


   Cetirizine HCl [Zyrtec] 10 mg PO DAILY


   Aspirin 81 mg PO DAILY #30 chew


   Atorvastatin [Lipitor] 80 mg PO DAILY #30 tab


   Nitroglycerin Sl Tabs [Nitrostat] 0.4 mg SUBLINGUAL Q5M PRN #25 tab


     PRN Reason: Chest Pain


   Clopidogrel [Plavix] 75 mg PO DAILY #30 tab


   Losartan [Cozaar] 100 mg PO DAILY


   Carvedilol [Coreg] 12.5 mg PO AC-BID


Discharge Medication List





Cetirizine HCl [Zyrtec] 10 mg PO DAILY 11/17/19 [History]


Cholecalciferol [Vitamin D3 (25 Mcg = 1000 Iu)] 1,000 unit PO DAILY 11/17/19 

[History]


Ferrous Sulfate [Iron (65 MG Elemental)] 325 mg PO DAILY 11/17/19 [History]


Pantoprazole Sodium [Protonix] 40 mg PO DAILY 11/17/19 [History]


Sertraline [Zoloft] 100 mg PO DAILY 11/17/19 [History]


Aspirin 81 mg PO DAILY #30 chew 11/20/19 [Rx]


Atorvastatin [Lipitor] 80 mg PO DAILY #30 tab 11/20/19 [Rx]


Clopidogrel [Plavix] 75 mg PO DAILY #30 tab 11/20/19 [Rx]


Nitroglycerin Sl Tabs [Nitrostat] 0.4 mg SUBLINGUAL Q5M PRN #25 tab 11/20/19 

[Rx]


Carvedilol [Coreg] 12.5 mg PO AC-BID 12/26/19 [History]


Losartan [Cozaar] 100 mg PO DAILY 12/26/19 [History]


HYDROcodone/APAP 5-325MG [[place] 5-325] 2 each PO TID PRN #12 tab 01/03/20 [Rx]


Isosorbide Mononitrate ER [Imdur] 30 mg PO DAILY 30 Days #30 tab.er.24h 01/03/20

 [Rx]








Follow up Appointment(s)/Referral(s): 


Alex Dillard MD [STAFF PHYSICIAN] - 01/06/20 11:15 am


[facility]inic [Primary Care Provider] - 1-2 days


Ambulatory/Diagnostic Orders: 


Basic Metabolic Panel [LAB.AMB] Time Frame: 3 Days, Location: None Selected


Patient Instructions/Handouts:  Chest Pain (GEN), Hypertensive Crisis (DC)


Activity/Diet/Wound Care/Special Instructions: 


***[facility] dc rx list [facility] at discharge***


[facility]





Activity Limited until follow-up


Follow-up with primary care provider upon discharge


Follow-up with cardiology as discussed and scheduled this week


Repeat labs in 2-3 days


Continue current diet


Discharge Disposition: HOME SELF-CARE

## 2020-01-03 NOTE — P.PN
Subjective


Progress Note Date: 01/03/20


Principal diagnosis: 


 Acute exacerbation of chronic obstructive pulmonary disease, hypertensive 

urgency





 On 01/02/2020 patient is seen in follow-up in intensive care unit, she is awake

and alert, oriented 3, no acute distress, denies any shortness of breath, 

patient is on room air, no running IVs, patient had a cardiac catheterization 

yesterday which showed in-stent osteal right coronary artery, and mild disease 

involving the left coronary system.  No hemodynamic significant stenosis.  

Patient is on a combination of beta blockers, Lipitor, Cozaar.  She is receiving

breathing treatments, no significant cough or congestion, lung sounds revealed 

fine crackles at the left lower base, no rhonchi, no wheezing.  Patient still 

has reproducible left chest pain, no significant distress.  No acute events 

overnight, less chest x-ray was done on 12/20/2019 showing no acute process.  

Today's labs have been reviewed showing normal CBC, correlation profile was 

within normal limits, electrolytes were within normal limits with the exception 

of CO2 which is a 31, BUN is 21 creatinine is 1.19.  No nausea, vomiting or 

diarrhea, patient is tolerating oral intake.





On 01/03/2020 patient seen in follow-up on medical surgical floor.  She is awake

and alert, in no acute distress, denies any shortness of breath, denies any 

chest pain, yesterday chest x-ray showed the no active cardiopulmonary disease. 

Pulse ox on 2 L of oxygen is 98%, no fever or chills, hemodynamically stable.  N

o acute events overnight.  Still has complaints of being tired and weak, vital 

signs are stable, tolerating ambulation with a walker, patient has been refusing

breathing treatments, and physical exam reveals a few scattered rhonchi and 

crackles.








Objective





- Vital Signs


Vital signs: 


                                   Vital Signs











Temp  97.3 F L  01/03/20 07:43


 


Pulse  75   01/03/20 08:00


 


Resp  16   01/03/20 07:43


 


BP  173/82   01/03/20 08:00


 


Pulse Ox  98   01/03/20 07:43








                                 Intake & Output











 01/02/20 01/03/20 01/03/20





 18:59 06:59 18:59


 


Intake Total 480 0 225


 


Balance 480 0 225


 


Weight 89 kg  


 


Intake:   


 


  Intake, IV Titration 0 0 





  Amount   


 


    Sodium Chloride 0.9% 1,  0 





    000 ml @ 0 mls/hr IV .STK   





    -[facility] Rx#:[id number]   


 


    Sodium Chloride 0.9% 1, 0  





    000 ml @ 75 mls/hr IV .   





    [id number] [facility] Rx#:[id number]   


 


  Oral 480  225


 


Other:   


 


  # Voids 1  














- Exam


 GENERAL EXAM: Alert, very pleasant, 65-year-old white female, 2 L of oxygen 

with a pulse ox of 98% comfortable in no apparent distress.


HEAD: Normocephalic/atraumatic.


EYES: Normal reaction of pupils, equal size.  Conjunctiva pink, sclera white.


NOSE: Clear with pink turbinates.


THROAT: No erythema or exudates.


NECK: No masses, no JVD, no thyroid enlargement, no adenopathy.


CHEST: No chest wall deformity.  Symmetrical expansion. 


LUNGS: Equal air entry with fine crackles left lower base, wheeze, rhonchi or 

dullness.


CVS: Regular rate and rhythm, normal S1 and S2, no gallops, no murmurs, no rubs


ABDOMEN: Soft, nontender.  No hepatosplenomegaly, normal bowel sounds, no 

guarding or rigidity.


EXTREMITIES: No clubbing, no edema, no cyanosis, 2+ pulses and upper and lower 

extremities.


MUSCULOSKELETAL: Muscle strength and tone normal.


SPINE: No scoliosis or deformity


SKIN: No rashes


CENTRAL NERVOUS SYSTEM: Alert and oriented -3.  No focal deficits, tone is 

normal in all 4 extremities.


PSYCHIATRIC: Alert and oriented -3.  Appropriate affect.  Intact judgment and 

insight.











- Labs


CBC & Chem 7: 


                                 01/01/20 04:18





                                 01/01/20 04:18





Assessment and Plan


Plan: 


 Assessment:





1 Hypertensive urgency with intermittent chest pain





2 Acute exacerbation of chronic obstructive pulmonary disease





3 History of heavy tobacco dependence





4 History of coronary disease with stent placement





5 Gastric esophageal reflux disease





6 Hypertension





7 History of anemia





Plan:





Continue on medical treatment, patient's breathing is stable, she's been 

refusing her breathing treatments, can discontinue them.  She is tolerating 

ambulation, no acute events overnight, no complaints of chest pain worsening 

dyspnea, no new chest x-ray today.  Patient is being considered for discharge 

today or tomorrow





I performed a history & physical examination of the patient and discussed their 

management with my nurse practitioner, Jeannine Belcher.  I reviewed the nurse 

practitioner's note and agree with the documented findings and plan of care.  

Lung sounds are positive for fine rales at left lower base.  The findings and 

the impression was discussed with the patient.  I attest to the documentation by

the nurse practitioner. 








Time with Patient: Less than 30

## 2020-01-03 NOTE — NM
EXAMINATION TYPE: NM pul vent and perfuse

 

DATE OF EXAM: 1/3/2020

 

COMPARISON: Chest x-ray 1/2/2020

 

HISTORY: Elevated d-dimer

 

TECHNIQUE:  Utilizing inhalation of 42 mCi Tc 99m DTPA aerosol and intravenous injection of 5.07 mCi 
of Tc 99m MAA, ventilation and perfusion images are acquired post injection in multiple projections.

 

FINDINGS: 

There is diminished radiotracer accumulation within the lateral right lung and upper right lung. This
 appears to be a small mismatched with the ventilation portion of the study. Large or moderate mismat
ched defects are not evident. Triple matched defect is not evident.

 

IMPRESSION: 

Low probability for pulmonary embolism.

## 2020-01-04 NOTE — DS
DISCHARGE SUMMARY



DATE OF SERVICE:

01/03/2020



FINAL DIAGNOSES:

1. Hypertensive urgency and current hypertension present on admission.

2. Chest pain possible unstable angina, status post cardiac catheterization showing

    mild disease of the left coronary system with patent stent in the ostium of the

    RCA.

3. Chronic obstructive pulmonary disease acute exacerbation with acute purulent

    tracheobronchitis.

4. Acute kidney injury, possible acute tubular necrosis, possibly secondary to

    medications.

5. Gastroesophageal reflux disease.

6. Increased creatinine with possible acute renal failure, possible acute tubular

    necrosis present on admission.

7. History of chronic liver disease.

8. History of coronary artery disease, stent.

9. History of aortic surgery.

10.History of pain stimulator removal.

11.History of depression.

12.Obesity with body mass index of 37.9.

13.Remote history of nicotine dependence.

14.FULL CODE.



RECOMMENDATION:

I recommend to continue current medications.



DISCHARGE DISPOSITION:

The patient will be discharged in a stable condition with guarded prognosis.



Total time taken is 35 minutes.



HISTORY OF PRESENT ILLNESS:

This is a 65-year-old woman with a past medical history of multiple medical problems

including chest pain and hypertensive urgency.  Blood pressure is controlled, but the

Cardiology performed a cardiac catheterization. Chest pain showed only mild coronary

disease as listed above.  The patient also had a D-dimer of 0.6.  A V/Q scan is low

probability of pulmonary embolism and the patient will be discharged.



On exam, vitals are stable.

CARDIOVASCULAR: S1, S2.

ABDOMEN:  Soft.

NERVOUS SYSTEM:  No focal deficit.



DISCHARGE ADVICE:

1. Diet is cardiac diet.

2. Activity limited until followup.

3. Follow up with [facility], Dr. Lester in 2-3 days.

4. Follow up with Dr. Dillard, Cardiology as recommended.



DISCHARGE MEDICATIONS ARE:

1. Coreg 12.5 mg p.o. b.i.d.

2. Cozaar 100 mg p.o. daily.

3. Iron sulfate 320 mg p.o. daily.

4. Protonix 40 mg daily.

5. Coreg.

6. Vitamin D 1000 daily.

7. Zoloft 100 mg p.o. daily.

8. Zetia 10 mg p.o. daily.

9. Aspirin 81 mg p.o. daily.

10.Fluticasone 1 puff daily.

11.Imdur ER 30 mg p.o. daily.

12.Lipitor 80 mg p.o. daily.

13.Nitrostat 0.4 sublingual p.r.n.

14.Norco 5 mg 2 tablets t.i.d. p.r.n.

15.Plavix 75 mg p.o. daily.

Once again, the patient will be discharged in a stable condition with guarded

prognosis.



Total time taken is 35 minutes.





LINNETTE / MICKEYN: [id number] / Job#: [id number]

## 2020-01-22 NOTE — XR
EXAMINATION TYPE: XR chest 2V

 

DATE OF EXAM: 1/22/2020

 

COMPARISON: Prior chest x-ray 1/2/2020

 

HISTORY: Chest pain, hypertension

 

TECHNIQUE:  Frontal and lateral views of the chest are obtained.

 

FINDINGS:  There is no focal air space opacity, pleural effusion, or pneumothorax seen.  The cardiac 
silhouette size is within normal limits.   The osseous structures are intact. There are overlying car
diac leads. Thoracic cord stimulator is again noted overlying the spine. Surgical clips are present i
n the right upper quadrant. Minimal apical pleural thickening is stable. There is thoracic spondylosi
s.

 

IMPRESSION:  No acute cardiopulmonary process.

## 2020-01-22 NOTE — ED
General Adult HPI





- General


Chief complaint: Recheck/Abnormal Lab/Rx


Stated complaint: High BP & allergic reaction


Time Seen by Provider: 01/22/20 12:21


Source: patient, RN notes reviewed, old records reviewed


Mode of arrival: wheelchair


Limitations: no limitations





- History of Present Illness


Initial comments: 





65-year-old female with history of hypertension presenting for evaluation of 

elevated blood pressure.  She has had fluctuating blood pressure over the past 

several weeks she has been recording her blood pressure on a regular basis.  She

recently had her losartan changed from 50 mg twice daily to 100 mg at night.  

She took this medication last night as prescribed and woke up this morning, she 

checked her blood pressure at that time and it was 200 systolic.  She had taken 

her morning medications which include Coreg 12.5 mg as well as Imdur.  She had 

fluctuating blood pressure throughout the morning hours.  She called the 

cardiology office and was instructed to present to the emergency department.  

She's had recent cardiac stenting approximately 3 weeks prior.  She reported a 

vague transient chest pain early this morning.  No central radiating chest pain.

 She also reports a headache which she states is worse when her blood pressure 

is elevated.  Many of these symptoms have been ongoing for many months.  No 

vomiting.  No abdominal pain.  No focal numbness or weakness.  No vision 

changes.





- Related Data


                                Home Medications











 Medication  Instructions  Recorded  Confirmed


 


Cetirizine HCl [Zyrtec] 10 mg PO DAILY 11/17/19 12/26/19


 


Cholecalciferol [Vitamin D3 (25 1,000 unit PO DAILY 11/17/19 12/26/19





Mcg = 1000 Iu)]   


 


Ferrous Sulfate [Iron (65  mg PO DAILY 11/17/19 12/26/19





Elemental)]   


 


Pantoprazole Sodium [Protonix] 40 mg PO DAILY 11/17/19 12/26/19


 


Sertraline [Zoloft] 100 mg PO DAILY 11/17/19 12/26/19


 


Carvedilol [Coreg] 12.5 mg PO AC-BID 12/26/19 12/26/19


 


Losartan [Cozaar] 100 mg PO DAILY 12/26/19 12/26/19








                                  Previous Rx's











 Medication  Instructions  Recorded


 


Aspirin 81 mg PO DAILY #30 chew 11/20/19


 


Atorvastatin [Lipitor] 80 mg PO DAILY #30 tab 11/20/19


 


Clopidogrel [Plavix] 75 mg PO DAILY #30 tab 11/20/19


 


Nitroglycerin Sl Tabs [Nitrostat] 0.4 mg SUBLINGUAL Q5M PRN #25 tab 11/20/19


 


Fluticasone Propionate 110 Mcg 1 puff INHALATION RT-BID 30 Days 01/03/20





[Flovent 110 Mcg Inhaler (Bulk)] #1 puff 


 


HYDROcodone/APAP 5-325MG [Norco 2 each PO TID PRN #12 tab 01/03/20





5-325]  


 


Isosorbide Mononitrate ER [Imdur] 30 mg PO DAILY 30 Days #30 01/03/20





 tab.er.24h 











                                    Allergies











Allergy/AdvReac Type Severity Reaction Status Date / Time


 


albuterol Allergy  Rash/Hives Verified 01/22/20 12:11


 


cortisone Allergy  Swelling/Pain Verified 01/22/20 12:11





   at inj site  


 


erythromycin base Allergy  Rash/Hives Verified 01/22/20 12:11


 


fentanyl [From Duragesic] Allergy  Unknown Verified 01/22/20 12:11





   Childhood  


 


iodine Allergy  Anaphylaxis Verified 01/22/20 12:11


 


lisinopril Allergy  Swelling Verified 01/22/20 12:11


 


modafinil Allergy  Unknown Verified 01/22/20 12:11


 


nystatin Allergy  Rash/Hives Verified 01/22/20 12:11


 


procaine [From Novocain] Allergy  Swelling Verified 01/22/20 12:11


 


vancomycin Allergy  Rash/Hives Verified 01/22/20 12:11


 


amlodipine AdvReac  Nausea & Verified 01/22/20 12:11





   Vomiting &  





   Diarrhea  


 


aspirin AdvReac  Abdominal Verified 01/22/20 12:11





   Pain  


 


cimetidine [From Tagamet] AdvReac  dizziness Verified 01/22/20 12:11


 


isosorbide AdvReac  Nausea & Verified 01/22/20 12:11





   Vomiting &  





   Diarrhea  


 


labetalol AdvReac  Nausea & Verified 01/22/20 12:11





   Vomiting &  





   Diarrhea  


 


metoprolol AdvReac  Diarrhea Verified 01/22/20 12:11


 


morphine AdvReac  Diarrhea Verified 01/22/20 12:11


 


oxcarbazepine AdvReac  hair loss Verified 01/22/20 12:11





[From Trileptal]     


 


paroxetine [From Paxil] AdvReac  tremors Verified 01/22/20 12:11














Review of Systems


ROS Statement: 


Those systems with pertinent positive or pertinent negative responses have been 

documented in the HPI.





ROS Other: All systems not noted in ROS Statement are negative.





Past Medical History


Past Medical History: Coronary Artery Disease (CAD), GERD/Reflux, Hypertension, 

Liver Disease


Additional Past Medical History / Comment(s): NERVE DAMAGE AND PAIN, anemia


History of Any Multi-Drug Resistant Organisms: None Reported


Past Surgical History: Cholecystectomy, Heart Catheterization With Stent, 

Hysterectomy, Orthopedic Surgery, Tonsillectomy


Additional Past Surgical History / Comment(s): AORTIC SURGERY, pt has a pain 

stimulator and it has been moved, B heels, L femoral endartectomy


Past Anesthesia/Blood Transfusion Reactions: No Reported Reaction


Date of Last Stent Placement:: 11-


Past Psychological History: Depression


Smoking Status: Former smoker


Past Alcohol Use History: None Reported


Past Drug Use History: None Reported





- Past Family History


  ** Mother


Family Medical History: No Reported History





  ** Father


Family Medical History: Coronary Artery Disease (CAD)





General Exam


Limitations: no limitations


General appearance: alert, in no apparent distress


Head exam: Present: atraumatic, normocephalic


Eye exam: Present: normal appearance, PERRL, EOMI


ENT exam: Present: normal exam


Neck exam: Present: normal inspection.  Absent: tenderness, meningismus


Respiratory exam: Present: normal lung sounds bilaterally.  Absent: respiratory 

distress, wheezes


Cardiovascular Exam: Present: regular rate, normal rhythm


GI/Abdominal exam: Present: soft.  Absent: distended, tenderness, guarding


Extremities exam: Present: normal inspection, normal capillary refill.  Absent: 

pedal edema


Back exam: Present: normal inspection, full ROM


Neurological exam: Present: alert, oriented X3, CN II-XII intact.  Absent: motor

sensory deficit


Psychiatric exam: Present: normal affect, normal mood


Skin exam: Present: warm, dry, intact.  Absent: cyanosis, diaphoretic





Course


                                   Vital Signs











  01/22/20 01/22/20





  12:11 13:25


 


Temperature 98.6 F 


 


Pulse Rate 75 70


 


Respiratory 18 16





Rate  


 


Blood Pressure 190/86 198/102


 


O2 Sat by Pulse 94 L 97





Oximetry  














EKG Findings





- EKG Comments:


EKG Findings:: EKG: Normal sinus rhythm, rate of 68, CO interval 152, QRS 

duration 70, , no ST segment elevation.  Repeat EKG obtained at 1413, 

normal sinus rhythm, rate of 72, CO interval 156, QRS duration 74, , no 

ST segment elevation.





Medical Decision Making





- Medical Decision Making





65-year-old female presenting with several complaints, chief complaint be 

fluctuating blood pressure and multiple changes in recent medication.  She is 

hypotensive upon arrival.  She is well-appearing with otherwise stable vitals.  

She has a nonischemic EKG with no ST segment elevation.  Chest x-ray negative 

for acute cardiopulmonary disease.  She's had ongoing headaches and given the 

elevated blood pressure head CT is performed which is negative for intracranial 

hemorrhage or mass effect.  She has a normal CBC, normal CMP with the exception 

of low magnesium at 1.0 this is replaced with 2 g of IV magnesium.  Negative 

troponin.  Patient has initial improvement in blood pressure to 140 systolic 

after IV hydralazine.  However her responses are with transient and she again 

becomes quite hypertensive at 190 systolic.  She is given labetalol.  She will 

be admitted for cardiology to evaluate blood pressure medication.  Her magnesium

will be replaced.  Case is discussed with Dr. Washington who will admit.





- Lab Data


Result diagrams: 


                                 01/22/20 12:40





                                 01/22/20 12:40


                                   Lab Results











  01/22/20 01/22/20 01/22/20 Range/Units





  12:40 12:40 12:40 


 


WBC  4.8    (3.8-10.6)  k/uL


 


RBC  4.87    (3.80-5.40)  m/uL


 


Hgb  13.6    (11.4-16.0)  gm/dL


 


Hct  40.4    (34.0-46.0)  %


 


MCV  82.8    (80.0-100.0)  fL


 


MCH  27.9    (25.0-35.0)  pg


 


MCHC  33.7    (31.0-37.0)  g/dL


 


RDW  15.0    (11.5-15.5)  %


 


Plt Count  152    (150-450)  k/uL


 


Neutrophils %  59    %


 


Lymphocytes %  29    %


 


Monocytes %  6    %


 


Eosinophils %  4    %


 


Basophils %  1    %


 


Neutrophils #  2.8    (1.3-7.7)  k/uL


 


Lymphocytes #  1.4    (1.0-4.8)  k/uL


 


Monocytes #  0.3    (0-1.0)  k/uL


 


Eosinophils #  0.2    (0-0.7)  k/uL


 


Basophils #  0.0    (0-0.2)  k/uL


 


PT    10.2  (9.0-12.0)  sec


 


INR    1.0  (<1.2)  


 


APTT    22.4  (22.0-30.0)  sec


 


Sodium   143   (137-145)  mmol/L


 


Potassium   3.9   (3.5-5.1)  mmol/L


 


Chloride   109 H   ()  mmol/L


 


Carbon Dioxide   25   (22-30)  mmol/L


 


Anion Gap   9   mmol/L


 


BUN   16   (7-17)  mg/dL


 


Creatinine   0.83   (0.52-1.04)  mg/dL


 


Est GFR (CKD-EPI)AfAm   86   (>60 ml/min/1.73 sqM)  


 


Est GFR (CKD-EPI)NonAf   75   (>60 ml/min/1.73 sqM)  


 


Glucose   84   (74-99)  mg/dL


 


Calcium   9.5   (8.4-10.2)  mg/dL


 


Magnesium   1.0 L   (1.6-2.3)  mg/dL


 


Total Bilirubin   0.8   (0.2-1.3)  mg/dL


 


AST   28   (14-36)  U/L


 


ALT   31   (4-34)  U/L


 


Alkaline Phosphatase   127 H   ()  U/L


 


Troponin I     (0.000-0.034)  ng/mL


 


Total Protein   6.7   (6.3-8.2)  g/dL


 


Albumin   4.3   (3.5-5.0)  g/dL














  01/22/20 Range/Units





  12:40 


 


WBC   (3.8-10.6)  k/uL


 


RBC   (3.80-5.40)  m/uL


 


Hgb   (11.4-16.0)  gm/dL


 


Hct   (34.0-46.0)  %


 


MCV   (80.0-100.0)  fL


 


MCH   (25.0-35.0)  pg


 


MCHC   (31.0-37.0)  g/dL


 


RDW   (11.5-15.5)  %


 


Plt Count   (150-450)  k/uL


 


Neutrophils %   %


 


Lymphocytes %   %


 


Monocytes %   %


 


Eosinophils %   %


 


Basophils %   %


 


Neutrophils #   (1.3-7.7)  k/uL


 


Lymphocytes #   (1.0-4.8)  k/uL


 


Monocytes #   (0-1.0)  k/uL


 


Eosinophils #   (0-0.7)  k/uL


 


Basophils #   (0-0.2)  k/uL


 


PT   (9.0-12.0)  sec


 


INR   (<1.2)  


 


APTT   (22.0-30.0)  sec


 


Sodium   (137-145)  mmol/L


 


Potassium   (3.5-5.1)  mmol/L


 


Chloride   ()  mmol/L


 


Carbon Dioxide   (22-30)  mmol/L


 


Anion Gap   mmol/L


 


BUN   (7-17)  mg/dL


 


Creatinine   (0.52-1.04)  mg/dL


 


Est GFR (CKD-EPI)AfAm   (>60 ml/min/1.73 sqM)  


 


Est GFR (CKD-EPI)NonAf   (>60 ml/min/1.73 sqM)  


 


Glucose   (74-99)  mg/dL


 


Calcium   (8.4-10.2)  mg/dL


 


Magnesium   (1.6-2.3)  mg/dL


 


Total Bilirubin   (0.2-1.3)  mg/dL


 


AST   (14-36)  U/L


 


ALT   (4-34)  U/L


 


Alkaline Phosphatase   ()  U/L


 


Troponin I  <0.012  (0.000-0.034)  ng/mL


 


Total Protein   (6.3-8.2)  g/dL


 


Albumin   (3.5-5.0)  g/dL














Critical Care Time


Critical Care Time: Yes


Total Critical Care Time: 35





Disposition


Clinical Impression: 


 Uncontrolled hypertension, Hypomagnesemia





Disposition: ADMITTED AS IP TO THIS [facility]


Condition: Stable


Is patient prescribed a controlled substance at d/c from ED?: No


Referrals: 


[facility] [Primary Care Provider] - 1-2 days


Decision to Admit Reason: Admit from EC


Decision Date: 01/22/20


Decision Time: 14:22

## 2020-01-22 NOTE — CT
EXAMINATION TYPE: CT brain wo con

 

DATE OF EXAM: 1/22/2020

 

HISTORY: HTN with headache and weakness

 

CT DLP: 1041.4 mGycm.  Automated Exposure Control for Dose Reduction was Utilized.

 

TECHNIQUE: CT scan of the head is performed without contrast.

 

COMPARISON: CT brain December 22, 2017.

 

FINDINGS:   There is no acute intracranial hemorrhage or midline shift identified. There is diffuse v
entricular and sulcal prominence consistent with diffuse age-related cerebral atrophy.  There is low-
attenuation in the periventricular white matter consistent with chronic small vessel ischemic change.
  The globes are intact and the visualized sinuses are clear.   

 

IMPRESSION:  No acute intracranial hemorrhage or midline shift.  There is mild diffuse age-related ce
rebral atrophy and chronic small vessel ischemic change redemonstrated.  No significant change from p
rior CT.

## 2020-01-23 NOTE — P.CRDCN
History of Present Illness


History of present illness: 


This is Zakiya Enamorado PA-C dictating a consult on this patient


The patient was interviewed and examined by me as well as by Dr. Napoles


Case discussed with Dr. Napoles and he agrees with the plan of care





IMPRESSION / ASSESSMENT: 


Hypertensive urgency, blood pressure has improved but remains elevated in the 

140s to 150 systolic over 60s diastolic


Atypical chest discomfort, cardiac enzymes negative 3, EKG shows no acute ST or

T-wave abnormalities


History of CAD status post stenting


History of peripheral vascular disease status post aortic surgery


Hypertension


Dyslipidemia


COPD


Former smoker





PLAN:


Switch losartan to valsartan 160 mg twice daily


Low-sodium diet


Continue Coreg 12.5 mg twice daily


Continue Imdur


Continue aspirin and Plavix


Continue atorvastatin


Monitor BMP





HPI


Patient is a 65-year-old female with a past medical history of CAD status post 

stenting in November, peripheral vascular disease, hypertension, dyslipidemia, 

COPD, former smoker who presented for blood pressure management.  She follows 

with Dr. Palacios.  She has had difficulty controlling her blood pressure over the 

last few months and it has been fluctuating.  She has had several adjustments to

her medications without any success.  Her hypertension management has also been 

complicated by many drug ALLERGIES causing hives.  She states that yesterday her

blood pressure was elevated in the 200s over 100s and she had a headache.  She 

has also been having chest pain intermittently.  She is short of breath on 

exertion secondary to her COPD.  She called the cardiology office yesterday and 

was instructed to go to the emergency department for further evaluation and 

management.  Upon arrival her blood pressure was 190/86 and her pulses in the 

70s.  EKG shows sinus mechanism with no acute ST or T-wave changes.  Brain CT 

showed no acute intracranial hemorrhage.  Chest x-ray showed no acute process.  

She was given labetalol which did bring her blood pressure down but also caused 

her to be dizzy.  She has been restarted on home blood pressure medication 

regimen including Coreg 12.5 mg twice a day, Imdur 30 mg daily, losartan 100 mg 

daily and her blood pressure remains elevated in the 140s.  Patient seen and 

examined resting in bed.  Continues to have intermittent chest discomfort and 

shortness of breath.  Repeat EKG while she was having chest discomfort again 

demonstrates no acute ST or T-wave abnormalities.





Patient denies NSAID use, alcohol, and states she does follow a low-sodium diet





ROS: 


No fevers, chills or rigors, 


no cough, phlegm or expectoration, 


no nausea, vomiting or diarrhea, 


no hematuria, dysuria, 


no musculoskeletal complaints, 


no strokes or seizures, 


Positive for hives





EXAMINATION:


Patient is afebrile, pulse in the 70s, respirations 16, blood pressure 146/64, 

oxygen saturation 99% on 2 L nasal cannula


Patient seen and examined resting in bed, in no acute distress


Lungs are clear to auscultation bilaterally, no wheezing rhonchi or crackles


Heart is regular, normal S1 and S2, no murmurs


No elevated JVD


No lower extremity edema


Abdomen soft and nontender palpation





REVIEW OF LABS, ECG & MEDICAL DATA


WBC 3.9, hemoglobin 12.3, platelets 143, potassium 4.6, BUN 17, creatinine 1.05


Troponin negative 3








Past Medical History


Past Medical History: Coronary Artery Disease (CAD), Cancer, CVA/TIA, 

GERD/Reflux, Hyperlipidemia, Hypertension, Liver Disease, Osteoarthritis (OA), 

Sleep Apnea/CPAP/BIPAP, Vascular Disorder


Additional Past Medical History / Comment(s): Pt recently admitted to [facility] on 

19 with htn urgency, exacerbation COPD, tracheobronchitis, acute kidney 

injury.    Other hx:  2007 Aortic thrombectomy/aortofemoral bypass/pt in 

coma/trach x 6 weeks, R hip to R toes nerve damage after 6 weeks comatose, past 

chronic pain to R hip/leg but has pain stimulator which has greatly helped, 

gastric ulcers, Nielsen's esophagus, hemorrhoids, 5 cancerous colon polyps 

removed, fatty liver, iron anemia, MIRELA with Cpap/O2 use, arthritis in feet.


History of Any Multi-Drug Resistant Organisms: None Reported


Past Surgical History: Cholecystectomy, Heart Catheterization, Heart 

Catheterization With Stent, Hysterectomy, Orthopedic Surgery, Tonsillectomy


Additional Past Surgical History / Comment(s): Bilateral heel bone 

spurs/bilateral feet fusions to lower arches, bone spurs removed from inside 

bilateral feet,  stent in R leg with bypass to left leg,  aortic 

thrombectomy/aortofemoral bypass, R knee arthroscopic surgery,  

neurostimulator implant,  neruostimulator paddle moved, 2019 stimulator 

replaced and relocation stimulator battery,  L common femoral endartectomy, 

EGDs, colonoscopies/cancerous polypectomies.


Past Anesthesia/Blood Transfusion Reactions: No Reported Reaction


Date of Last Stent Placement:: 2019


Smoking Status: Former smoker





- Past Family History


  ** Mother


Family Medical History: No Reported History


Additional Family Medical History / Comment(s): Mother was an alcoholic and 

at the age of 36 yrs.





  ** Father


Family Medical History: Cancer


Additional Family Medical History / Comment(s): Father had jaw/throat cancer.  

He was a smoker.





Medications and Allergies


                                Home Medications











 Medication  Instructions  Recorded  Confirmed  Type


 


Cetirizine HCl [Zyrtec] 10 mg PO DAILY 19 History


 


Cholecalciferol [Vitamin D3 (25 1,000 unit PO DAILY 19 History





Mcg = 1000 Iu)]    


 


Ferrous Sulfate [Iron (65  mg PO DAILY 19 History





Elemental)]    


 


Pantoprazole Sodium [Protonix] 40 mg PO DAILY 19 History


 


Sertraline [Zoloft] 100 mg PO DAILY 19 History


 


Aspirin 81 mg PO DAILY #30 chew 19 Rx


 


Atorvastatin [Lipitor] 80 mg PO DAILY #30 tab 19 Rx


 


Clopidogrel [Plavix] 75 mg PO DAILY #30 tab 19 Rx


 


Nitroglycerin Sl Tabs [Nitrostat] 0.4 mg SUBLINGUAL Q5M PRN #25 tab 19 Rx


 


Carvedilol [Coreg] 12.5 mg PO AC-BID 19 History


 


Fluticasone Propionate 110 Mcg 1 puff INHALATION RT-BID 30 Days 20 Rx





[Flovent 110 Mcg Inhaler (Bulk)] #1 puff   


 


Isosorbide Mononitrate ER [Imdur] 30 mg PO DAILY 30 Days #30 20 

Rx





 tab.er.24h   


 


HYDROcodone/APAP 5-325MG [Norco 2 tab PO TID PRN 20 History





5-325]    


 


Losartan Potassium [Cozaar] 100 mg PO HS 20 History








                                    Allergies











Allergy/AdvReac Type Severity Reaction Status Date / Time


 


albuterol Allergy  Rash/Hives Verified 20 14:27


 


cortisone Allergy  Swelling/Pain Verified 20 14:27





   at inj site  


 


erythromycin base Allergy  Rash/Hives Verified 20 14:27


 


fentanyl [From Duragesic] Allergy  Unknown Verified 20 14:27





   Childhood  


 


Iodinated Contrast Media Allergy  Rash/Hives-EVEN Verified 20 14:31





   WITH PREP  


 


iodine Allergy  Anaphylaxis Verified 20 14:27


 


lisinopril Allergy  Swelling Verified 20 14:27


 


modafinil Allergy  Unknown Verified 20 14:27


 


nystatin Allergy  Rash/Hives Verified 20 14:27


 


procaine [From Novocain] Allergy  Swelling Verified 20 14:27


 


vancomycin Allergy  Rash/Hives Verified 20 14:27


 


amlodipine AdvReac  Nausea & Verified 20 14:27





   Vomiting &  





   Diarrhea  


 


aspirin AdvReac  Abdominal Verified 20 14:27





   Pain  


 


atorvastatin [From Lipitor] AdvReac  MUSCLE PAIN Verified 20 14:31


 


cimetidine [From Tagamet] AdvReac  dizziness Verified 20 14:27


 


isosorbide AdvReac  Nausea & Verified 20 14:27





   Vomiting &  





   Diarrhea  


 


labetalol AdvReac  Nausea & Verified 20 14:27





   Vomiting &  





   Diarrhea  


 


metoprolol AdvReac  Diarrhea Verified 20 14:27


 


morphine AdvReac  Diarrhea Verified 20 14:27


 


oxcarbazepine AdvReac  hair loss Verified 20 14:27





[From Trileptal]     


 


paroxetine [From Paxil] AdvReac  tremors Verified 20 14:27


 


rosuvastatin [From Crestor] AdvReac  MUSCLE PAIN Verified 20 14:31


 


simvastatin [From Zocor] AdvReac  MUSCLE PAIN Verified 20 14:31














Physical Exam


Vitals: 


                                   Vital Signs











  Temp Pulse Pulse Resp BP BP BP


 


 20 11:32    72  16   


 


 20 11:31  98.2 F   72  16    146/64


 


 20 08:24  97.7 F   70  16   143/69 


 


 20 08:00    70  16   


 


 20 07:53       


 


 20 06:21       161/78 


 


 20 02:53  97.9 F   63  16   138/86 


 


 20 23:45    73  18   116/64 


 


 20 19:49  98.2 F   67  16   121/74 


 


 20 17:24  98.0 F   71  20   153/75 


 


 20 16:17   71   16  116/54  


 


 20 15:00   70    170/73  


 


 20 14:33   72   16  155/64  


 


 20 14:00   72   16  147/72  


 


 20 13:30   69   16  198/102  


 


 20 13:25   70   16  198/102  


 


 20 13:00   66   16  188/112  


 


 20 12:53       














  Pulse Ox


 


 20 11:32 


 


 20 11:31  99


 


 20 08:24  98


 


 20 08:00 


 


 20 07:53  95


 


 20 06:21 


 


 20 02:53  96


 


 20 23:45  93 L


 


 20 19:49  92 L


 


 20 17:24  96


 


 20 16:17  96


 


 20 15:00  96


 


 20 14:33  98


 


 20 14:00  98


 


 20 13:30  96


 


 20 13:25  97


 


 20 13:00  95


 


 20 12:53  94 L








                                Intake and Output











 20





 22:59 06:59 14:59


 


Intake Total 225  260


 


Balance 225  260


 


Intake:   


 


  IV   20


 


    Invasive Line 1   20


 


  Oral 225  240


 


Other:   


 


  Voiding Method Toilet  Toilet


 


  # Voids 1 2 0


 


  # Bowel Movements   0


 


  Weight 88.904 kg 88.8 kg 














Results





                                 20 05:42





                                 20 05:42


                                 Cardiac Enzymes











  20 Range/Units





  12:40 12:40 18:13 


 


AST  28    (14-36)  U/L


 


Troponin I   <0.012  <0.012  (0.000-0.034)  ng/mL














  20 Range/Units





  01:20 05:42 


 


AST   29  (14-36)  U/L


 


Troponin I  <0.012   (0.000-0.034)  ng/mL








                                   Coagulation











  20 Range/Units





  12:40 


 


PT  10.2  (9.0-12.0)  sec


 


APTT  22.4  (22.0-30.0)  sec








                                       CBC











  20 Range/Units





  12:40 05:42 


 


WBC  4.8  3.9  (3.8-10.6)  k/uL


 


RBC  4.87  4.39  (3.80-5.40)  m/uL


 


Hgb  13.6  12.3  (11.4-16.0)  gm/dL


 


Hct  40.4  37.5  (34.0-46.0)  %


 


Plt Count  152  143 L  (150-450)  k/uL








                          Comprehensive Metabolic Panel











  20 Range/Units





  12:40 05:42 


 


Sodium  143  144  (137-145)  mmol/L


 


Potassium  3.9  4.6  (3.5-5.1)  mmol/L


 


Chloride  109 H  106  ()  mmol/L


 


Carbon Dioxide  25  31 H  (22-30)  mmol/L


 


BUN  16  17  (7-17)  mg/dL


 


Creatinine  0.83  1.05 H  (0.52-1.04)  mg/dL


 


Glucose  84  90  (74-99)  mg/dL


 


Calcium  9.5  9.0  (8.4-10.2)  mg/dL


 


AST  28  29  (14-36)  U/L


 


ALT  31  31  (4-34)  U/L


 


Alkaline Phosphatase  127 H  95  ()  U/L


 


Total Protein  6.7  6.5  (6.3-8.2)  g/dL


 


Albumin  4.3  4.1  (3.5-5.0)  g/dL








                               Current Medications











Generic Name Dose Route Start Last Admin





  Trade Name Freq  PRN Reason Stop Dose Admin


 


Acetaminophen  650 mg  20 14:12  20 17:04





  Tylenol Tab  PO   650 mg





  Q6HR PRN   Administration





  Mild Pain or Fever > 100.5  


 


Hydrocodone Bitart/Acetaminophen  1 each  20 09:48  20 09:57





  [place] 5-325  PO   1 each





  Q6HR PRN   Administration





  MODERATE Pain  


 


Aspirin  81 mg  20 09:00  20 08:28





  Aspirin  PO   81 mg





  DAILY TERESO   Administration


 


Atorvastatin Calcium  80 mg  20 09:00  20 08:29





  Lipitor  PO   80 mg





  DAILY TERESO   Administration


 


Carvedilol  12.5 mg  20 17:30  20 06:18





  Coreg  PO   12.5 mg





  AC-BID TERESO   Administration


 


Clopidogrel Bisulfate  75 mg  20 09:00  20 08:29





  Plavix  PO   75 mg





  DAILY [facility]   Administration


 


Hydromorphone HCl  0.5 mg  20 14:12  20 01:31





  Dilaudid  IVP   0.5 mg





  Q3HR PRN   Administration





  Moderate Pain  


 


Isosorbide Mononitrate  30 mg  20 09:00  20 08:29





  Imdur  PO   30 mg





  DAILY [facility]   Administration


 


Naloxone HCl  0.2 mg  20 14:12 





  Narcan  IV  





  Q2M PRN  





  Opioid Reversal  


 


Nitroglycerin  0.4 mg  20 14:14 





  Nitrostat  SUBLINGUAL  





  Q5M PRN  





  Chest Pain  


 


Pantoprazole Sodium  40 mg  20 07:30  20 06:18





  Protonix  PO   40 mg





  AC-BRKFST [facility]   Administration


 


Sertraline HCl  100 mg  20 09:00  20 08:29





  Zoloft  PO   100 mg





  DAILY [facility]   Administration


 


Valsartan  160 mg  20 21:00 





  Diovan  PO  





  BID [facility]  








                                Intake and Output











 20





 22:59 06:59 14:59


 


Intake Total 225  260


 


Balance 225  260


 


Intake:   


 


  IV   20


 


    Invasive Line 1   20


 


  Oral 225  240


 


Other:   


 


  Voiding Method Toilet  Toilet


 


  # Voids 1 2 0


 


  # Bowel Movements   0


 


  Weight 88.904 kg 88.8 kg 








                                        





                                 20 05:42 





                                 20 05:42

## 2020-01-23 NOTE — P.HPIM
History of Present Illness


H&P Date: 20


Chief Complaint: Uncontrolled hypertension





Patient is a 65 old female with a known history of coronary artery disease 

status post stent placement in 2019, followed by repeat cardiac 

catheterization earlier this month, history of CVA/TIA, hypertension, 

hyperlipidemia, osteoarthritis, obstructive sleep apnea on CPAP, peripheral 

vascular disease status post aortofemoral bypass, history of neurostimulator 

implant and removal in  and replaced in 2019, left common femoral 

endarterectomy and multiple other medical problems came to ER from her 

cardiologist's office due to uncontrolled hypertension and fluctuating blood 

pressures.  Patient has been having fluctuating blood pressure over the past 

several weeks.  Patient is currently on Coreg and losartan at home.  Patient 

blood pressure was elevated with systolic blood pressure greater than 200 mL Hg 

while in the clinic.  Patient was sent to Hospital ER for her pressure 

medication titration.  Patient otherwise denied any complaints of shortness of 

breath.  Noted to have some chest tightness otherwise.  No radiation or chest 

pain.  Patient was given a dose of IV hydralazine and labetalol in the ER.


Denied any complaints of nausea vomiting or abdominal pain.  Denied any headache

or dizziness or lightheadedness.  No weakness.  Denied any visual symptoms.


EKG showed normal sinus rhythm.


CT head showed no acute intracranial hemorrhage or midline shift.  There is mild

diffuse age-related cerebral atrophy and chronic small vessel ischemic changes 

redemonstrated.  No significant change from the prior CT.


Chest x-ray showed no acute cardiopulmonary process.


Troponin 2 negative magnesium 1.1








Review of Systems





Constitutional: Patient denies any fever or chills .  No generalized weakness or

weight loss.  


Abdomen: Patient denied nausea vomiting and diarrhea and abdominal pain.


Cardiovascular: Patient denies any chest pain or short of breath no palpitati

ons.


Respiratory: patient denied any cough is from production.  No shortness of 

breath


Neurologic: Patient denied any numbness or tingling headache.


Musculoskeletal: Patient denies any complaints of joint swelling or deformity.


Skin: Negative


Psychiatric: Negative


Endocrine: No heat or cold intolerance.  No recent weight gain.


Genitourinary: No dysuria or hematuria.


All other 14 point ROS negative except the above





Past Medical History


Past Medical History: Coronary Artery Disease (CAD), Cancer, CVA/TIA, 

GERD/Reflux, Hyperlipidemia, Hypertension, Liver Disease, Osteoarthritis (OA), 

Sleep Apnea/CPAP/BIPAP, Vascular Disorder


Additional Past Medical History / Comment(s): Pt recently admitted to [facility] on 

19 with htn urgency, exacerbation COPD, tracheobronchitis, acute kidney 

injury.    Other hx:  2007 Aortic thrombectomy/aortofemoral bypass/pt in 

coma/trach x 6 weeks, R hip to R toes nerve damage after 6 weeks comatose, past 

chronic pain to R hip/leg but has pain stimulator which has greatly helped, 

gastric ulcers, Nielsen's esophagus, hemorrhoids, 5 cancerous colon polyps 

removed, fatty liver, iron anemia, MIRELA with Cpap/O2 use, arthritis in feet.


History of Any Multi-Drug Resistant Organisms: None Reported


Past Surgical History: Cholecystectomy, Heart Catheterization, Heart 

Catheterization With Stent, Hysterectomy, Orthopedic Surgery, Tonsillectomy


Additional Past Surgical History / Comment(s): Bilateral heel bone spurs/mercedes

ateral feet fusions to lower arches, bone spurs removed from inside bilateral 

feet,  stent in R leg with bypass to left leg,  aortic 

thrombectomy/aortofemoral bypass, R knee arthroscopic surgery,  

neurostimulator implant,  neruostimulator paddle moved,  stimulator 

replaced and relocation stimulator battery,  L common femoral endartectomy, 

EGDs, colonoscopies/cancerous polypectomies.


Past Anesthesia/Blood Transfusion Reactions: No Reported Reaction


Date of Last Stent Placement:: 2019


Smoking Status: Former smoker





- Past Family History


  ** Mother


Family Medical History: No Reported History


Additional Family Medical History / Comment(s): Mother was an alcoholic and 

at the age of 36 yrs.





  ** Father


Family Medical History: Cancer


Additional Family Medical History / Comment(s): Father had jaw/throat cancer.  

He was a smoker.





Medications and Allergies


                                Home Medications











 Medication  Instructions  Recorded  Confirmed  Type


 


Cetirizine HCl [Zyrtec] 10 mg PO DAILY 19 History


 


Cholecalciferol [Vitamin D3 (25 1,000 unit PO DAILY 19 History





Mcg = 1000 Iu)]    


 


Ferrous Sulfate [Iron (65  mg PO DAILY 19 History





Elemental)]    


 


Pantoprazole Sodium [Protonix] 40 mg PO DAILY 19 History


 


Sertraline [Zoloft] 100 mg PO DAILY 19 History


 


Aspirin 81 mg PO DAILY #30 chew 19 Rx


 


Atorvastatin [Lipitor] 80 mg PO DAILY #30 tab 19 Rx


 


Clopidogrel [Plavix] 75 mg PO DAILY #30 tab 19 Rx


 


Nitroglycerin Sl Tabs [Nitrostat] 0.4 mg SUBLINGUAL Q5M PRN #25 tab 19 Rx


 


Carvedilol [Coreg] 12.5 mg PO AC-BID 19 History


 


Fluticasone Propionate 110 Mcg 1 puff INHALATION RT-BID 30 Days 20 Rx





[Flovent 110 Mcg Inhaler (Bulk)] #1 puff   


 


Isosorbide Mononitrate ER [Imdur] 30 mg PO DAILY 30 Days #30 20 

Rx





 tab.er.24h   


 


HYDROcodone/APAP 5-325MG [Norco 2 tab PO TID PRN 20 History





5-325]    


 


Losartan Potassium [Cozaar] 100 mg PO HS 20 History








                                    Allergies











Allergy/AdvReac Type Severity Reaction Status Date / Time


 


albuterol Allergy  Rash/Hives Verified 20 14:27


 


cortisone Allergy  Swelling/Pain Verified 20 14:27





   at inj site  


 


erythromycin base Allergy  Rash/Hives Verified 20 14:27


 


fentanyl [From Duragesic] Allergy  Unknown Verified 20 14:27





   Childhood  


 


Iodinated Contrast Media Allergy  Rash/Hives-EVEN Verified 20 14:31





   WITH PREP  


 


iodine Allergy  Anaphylaxis Verified 20 14:27


 


lisinopril Allergy  Swelling Verified 20 14:27


 


modafinil Allergy  Unknown Verified 20 14:27


 


nystatin Allergy  Rash/Hives Verified 20 14:27


 


procaine [From Novocain] Allergy  Swelling Verified 20 14:27


 


vancomycin Allergy  Rash/Hives Verified 20 14:27


 


amlodipine AdvReac  Nausea & Verified 20 14:27





   Vomiting &  





   Diarrhea  


 


aspirin AdvReac  Abdominal Verified 20 14:27





   Pain  


 


atorvastatin [From Lipitor] AdvReac  MUSCLE PAIN Verified 20 14:31


 


cimetidine [From Tagamet] AdvReac  dizziness Verified 20 14:27


 


isosorbide AdvReac  Nausea & Verified 20 14:27





   Vomiting &  





   Diarrhea  


 


labetalol AdvReac  Nausea & Verified 20 14:27





   Vomiting &  





   Diarrhea  


 


metoprolol AdvReac  Diarrhea Verified 20 14:27


 


morphine AdvReac  Diarrhea Verified 20 14:27


 


oxcarbazepine AdvReac  hair loss Verified 20 14:27





[From Trileptal]     


 


paroxetine [From Paxil] AdvReac  tremors Verified 20 14:27


 


rosuvastatin [From Crestor] AdvReac  MUSCLE PAIN Verified 20 14:31


 


simvastatin [From Zocor] AdvReac  MUSCLE PAIN Verified 20 14:31














Physical Exam


Vitals: 


                                   Vital Signs











  Temp Pulse Resp BP Pulse Ox


 


 20 16:17   71  16  116/54  96


 


 20 15:00   70   170/73  96


 


 20 14:33   72  16  155/64  98


 


 20 14:00   72  16  147/72  98


 


 20 13:30   69  16  198/102  96


 


 20 13:25   70  16  198/102  97


 


 20 13:00   66  16  188/112  95


 


 20 12:53      94 L


 


 20 12:11  98.6 F  75  18  190/86  94 L








                                Intake and Output











 20





 06:59 14:59 22:59


 


Other:   


 


  Weight  88.904 kg 88.904 kg














PHYSICAL EXAMINATION: 


Patient is lying in the bed comfortably, no acute distress, awake alert and 

oriented.. 


HEENT: Normocephalic. Neck is supple. Pupils reactive. Nostrils clear. Oral 

cavity is moist. Ears reveal no drainage. 


Neck reveals no JVD, carotid bruits, or thyromegaly. 


CHEST EXAMINATION: Trachea is central. Symmetrical expansion.  Bibasilar dimi

nished air entry.  Lung fields clear to auscultation and percussion. 


CARDIAC: Normal S1, S2 with no gallops. No murmurs 


ABDOMEN: Soft. Bowel sounds normal. No organomegaly. No abdominal bruits. 


Extremities: reveal no edema.  No clubbing or cyanosis


Neurologically awake, alert, oriented x3 with well-coordinated movements.  No 

focal deficits noted


Skin: No rash or skin lesions. 


Psychiatric: Coperative.  Nonsuicidal


Musculoskeletal: No joint swelling or deformity.  Normal range of motion.








Results


CBC & Chem 7: 


                                 20 05:42





                                 20 05:42


Labs: 


                  Abnormal Lab Results - Last 24 Hours (Table)











  20 Range/Units





  12:40 


 


Chloride  109 H  ()  mmol/L


 


Magnesium  1.0 L  (1.6-2.3)  mg/dL


 


Alkaline Phosphatase  127 H  ()  U/L














Thrombosis Risk Factor Assmnt





- DVT/VTE Prophylaxis


DVT/VTE Prophylaxis: Pharmacologic Prophylaxis ordered





- Choose All That Apply


Any of the Below Risk Factors Present?: Yes


Each Factor Represents 1 point: Abnormal pulmonary function (COPD), Obesity (BMI

>25)


Other Risk Factors: Yes


Each Risk Factor Represents 2 Points: Age 61-74 years, Malignancy


Other congenital or acquired thrombophilia - If yes, enter type in comment: No


Thrombosis Risk Factor Assessment Total Risk Factor Score: 6


Thrombosis Risk Factor Assessment Level: High Risk





Assessment and Plan


Assessment: 





Hypertensive urgency


Coronary artery disease with history of stent placement in 2019


Peripheral vascular disease with history of aortofemoral bypass


History of neurostimulator implant due to chronic right hip/leg pain


Hyperlipidemia


GERD


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


Osteoarthritis


Objective sleep apnea on CPAP


History of gastric ulcer/Nielsen's esophagus


History of 5 cancerous polyps removed


DVT prophylaxis


Previous history of smoking


Morbid obesity BMI 38.2





Plan:


Patient was given a dose of IV hydralazine and labetalol in the ER.  We will 

start back on Coreg and losartan as per her home dose.  Continue to monitor 

blood pressure closely.  Serial EKGs and troponins.  Cardiac he was reconsulted.

 Further recommendations based on the clinical course.





Time with Patient: Greater than 30

## 2020-01-24 NOTE — P.PN
Subjective


Progress Note Date: 01/24/20


Principal diagnosis: 





Patient is a 65 old female with a known history of coronary artery disease 

status post stent placement in November 2019, followed by repeat cardiac 

catheterization earlier this month, history of CVA/TIA, hypertension, 

hyperlipidemia, osteoarthritis, obstructive sleep apnea on CPAP, peripheral 

vascular disease status post aortofemoral bypass, history of neurostimulator 

implant and removal in 2014 and replaced in 2019, left common femoral 

endarterectomy and multiple other medical problems came to ER from her 

cardiologist's office due to uncontrolled hypertension and fluctuating blood 

pressures.  Patient has been having fluctuating blood pressure over the past 

several weeks.  Patient is currently on Coreg and losartan at home.  Patient 

blood pressure was elevated with systolic blood pressure greater than 200 mL Hg 

while in the clinic.  Patient was sent to Hospital ER for her pressure 

medication titration.  Patient otherwise denied any complaints of shortness of 

breath.  Noted to have some chest tightness otherwise.  No radiation or chest 

pain.  Patient was given a dose of IV hydralazine and labetalol in the ER.


Denied any complaints of nausea vomiting or abdominal pain.  Denied any headache

or dizziness or lightheadedness.  No weakness.  Denied any visual symptoms.


EKG showed normal sinus rhythm.


CT head showed no acute intracranial hemorrhage or midline shift.  There is mild

diffuse age-related cerebral atrophy and chronic small vessel ischemic changes 

redemonstrated.  No significant change from the prior CT.


Chest x-ray showed no acute cardiopulmonary process.


Troponin 2 negative magnesium 1.1





01/24/2020


Patient is sitting up in the chair with family at the bedside and appears to be 

in no acute distress.  Patient states that her blood pressure is little more 

under control in the 140's systolic.  Patient states that she continues to have 

intermittent chest pain and shortness of breath with exertion or position 

changes.  Patient continues to state that she is itchy and having swelling and 

redness around the eyes due to a possible ALLERGIC reaction.  Benadryl is 

ordered as needed.  Patient denies any throat swelling or difficulty swallowing.

 She states that she has oxygen as needed at home and has been wearing 2 L via 

nasal cannula this entire admission stating that she has shortness of breath 

continuously.  Patient is quite anxious and is a high risk for readmissions.  

Cardiology is following the patient and recommended a renal ultrasound that is 

not routinely done and a prescription was provided for patient to have this done

outpatient.





Objective





- Vital Signs


Vital signs: 


                                   Vital Signs











Temp  98.2 F   01/24/20 11:52


 


Pulse  67   01/24/20 11:52


 


Resp  16   01/24/20 11:52


 


BP  140/78   01/24/20 11:52


 


Pulse Ox  97   01/24/20 11:52








                                 Intake & Output











 01/23/20 01/24/20 01/24/20





 18:59 06:59 18:59


 


Intake Total 710 60 740


 


Balance 710 60 740


 


Weight  89.2 kg 


 


Intake:   


 


  IV 50 60 20


 


    Invasive Line 1 30  


 


    Invasive Line 2 20 60 20


 


  Oral 660  720


 


Other:   


 


  Voiding Method Toilet  Toilet


 


  # Voids 3 1 1


 


  # Bowel Movements 0  














- Exam





Patient is sitting up in the chair comfortably, no acute distress, awake alert 

and oriented.. 


HEENT: Normocephalic. Neck is supple. Pupils reactive. Nostrils clear. Oral 

cavity is moist. Ears reveal no drainage. 


Neck reveals no JVD, carotid bruits, or thyromegaly. 


CHEST EXAMINATION: Trachea is central. Symmetrical expansion.  Bibasilar 

diminished air entry.  Lung fields clear to auscultation and percussion. 


CARDIAC: Normal S1, S2 with no gallops. No murmurs 


ABDOMEN: Soft. Bowel sounds normal. No organomegaly. No abdominal bruits. 


Extremities: reveal no edema.  No clubbing or cyanosis


Neurologically awake, alert, oriented x3 with well-coordinated movements.  No 

focal deficits noted


Skin: No rash or skin lesions.  One spot of slight redness noted on the right 

upper arm but is not representative of a hive.  Area of redness is approximately

5 mm x 5 mm with no surrounding swelling and is not raised in appearance.


Psychiatric: Cooperative.  Non-suicidal


Musculoskeletal: No joint swelling or deformity.  Normal range of motion.








- Labs


CBC & Chem 7: 


                                 01/23/20 05:42





                                 01/24/20 10:49


Labs: 


                  Abnormal Lab Results - Last 24 Hours (Table)











  01/24/20 Range/Units





  10:49 


 


Carbon Dioxide  31 H  (22-30)  mmol/L














Assessment and Plan


Assessment: 





Hypertensive urgency


Coronary artery disease with history of stent placement in November 2019


Peripheral vascular disease with history of aortofemoral bypass


History of neurostimulator implant due to chronic right hip/leg pain


Hyperlipidemia


GERD


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


Osteoarthritis


Objective sleep apnea on CPAP


History of gastric ulcer/Nielsen's esophagus


History of 5 cancerous polyps removed


DVT prophylaxis


Previous history of smoking


Morbid obesity BMI 38.2





Plan:


Recommend to continue current medications, management, and symptomatic wilfredo

atment.  Patient states that she had a possible ALLERGIC reaction with hives and

redness generalized along with swelling and redness around bilateral eyes.  

Patient will continue with Benadryl as needed.  Patient denies any throat 

swelling or difficulty in breathing at this time.  Cardiology following.  

Cardiology was requesting a renal ultrasound but is not routinely done here and 

will be done outpatient.  A prescription was provided.  Patient was started on 

valsartan 160 mg twice daily and is tolerating thus far.  Will continue to 

monitor blood pressure manually closely.  Repeat labs within normal limits.  

Creatinine has improved and is 0.81.  Further recommendations to follow based on

the clinical course of the patient.  Possible discharge in 24-48 hours.

## 2020-01-24 NOTE — P.PN
Subjective


This is Zakiya Enamorado PA-C dictating a progress note on this patient


The patient was interviewed and examined by me as well as by Dr. Napoles


Case discussed with Dr. Napoles and he agrees with the plan of care





HPI/interval history


Patient is a 65-year-old female with a past medical history of CAD status post 

stenting, hypertension, dyslipidemia, and vascular disease who presented with 

complaints of chest pain and uncontrolled high blood pressure.  Yesterday we 

switched her to valsartan and her blood pressure has improved somewhat.  Patient

seen and examined resting in bed.  Complaining of swollen eyes, continues to 

have chest discomfort and feeling very tired.





EXAMINATION


Patient is afebrile, pulse in the 60s, blood pressure 140s over 70s, 

respirations 16, oxygen saturation 97% on 2 L nasal cannula


Patient seen and examined resting in bed, appears comfortable


Lungs are clear to auscultation bilaterally


Heart is regular, no audible murmurs





REVIEW OF LABS, ECG


Troponin negative 3


Potassium 5.1, BUN 14, creatinine 0.81





IMPRESSION / ASSESSMENT: 


Hypertensive urgency, blood pressure improving in the 140s over 70s 


Atypical chest discomfort, cardiac enzymes negative 3, EKG shows no acute ST or

T-wave abnormalities, continues to have intermittent discomfort


History of CAD status post stenting


History of peripheral vascular disease status post aortic surgery


Dyslipidemia


COPD


Former smoker





PLAN:


Manual blood pressures only


Continue to monitor blood pressure on valsartan


Check renal artery Dopplers and random cortisol level


Continue aspirin, Plavix, Lipitor, Coreg 12.5 twice a day, isosorbide
























































Objective





- Vital Signs


Vital signs: 


                                   Vital Signs











Temp  98.2 F   01/24/20 11:52


 


Pulse  67   01/24/20 11:52


 


Resp  16   01/24/20 11:52


 


BP  140/78   01/24/20 11:52


 


Pulse Ox  97   01/24/20 11:52








                                 Intake & Output











 01/23/20 01/24/20 01/24/20





 18:59 06:59 18:59


 


Intake Total 710 60 380


 


Balance 710 60 380


 


Weight  89.2 kg 


 


Intake:   


 


  IV 50 60 20


 


    Invasive Line 1 30  


 


    Invasive Line 2 20 60 20


 


  Oral 660  360


 


Other:   


 


  Voiding Method Toilet  Toilet


 


  # Voids 3 1 1


 


  # Bowel Movements 0  














- Labs


CBC & Chem 7: 


                                 01/23/20 05:42





                                 01/24/20 10:49


Labs: 


                  Abnormal Lab Results - Last 24 Hours (Table)











  01/24/20 Range/Units





  10:49 


 


Carbon Dioxide  31 H  (22-30)  mmol/L

## 2020-01-25 NOTE — P.PN
Subjective


Progress Note Date: 01/23/20


Principal diagnosis: 





Uncontrolled hypertension





Patient is a 65 old female with a known history of coronary artery disease 

status post stent placement in November 2019, followed by repeat cardiac 

catheterization earlier this month, history of CVA/TIA, hypertension, 

hyperlipidemia, osteoarthritis, obstructive sleep apnea on CPAP, peripheral 

vascular disease status post aortofemoral bypass, history of neurostimulator 

implant and removal in 2014 and replaced in 2019, left common femoral 

endarterectomy and multiple other medical problems came to ER from her 

cardiologist's office due to uncontrolled hypertension and fluctuating blood 

pressures.  Patient has been having fluctuating blood pressure over the past 

several weeks.  Patient is currently on Coreg and losartan at home.  Patient 

blood pressure was elevated with systolic blood pressure greater than 200 mL Hg 

while in the clinic.  Patient was sent to Hospital ER for her pressure 

medication titration.  Patient otherwise denied any complaints of shortness of 

breath.  Noted to have some chest tightness otherwise.  No radiation or chest 

pain.  Patient was given a dose of IV hydralazine and labetalol in the ER.


Denied any complaints of nausea vomiting or abdominal pain.  Denied any headache

or dizziness or lightheadedness.  No weakness.  Denied any visual symptoms.


EKG showed normal sinus rhythm.


CT head showed no acute intracranial hemorrhage or midline shift.  There is mild

diffuse age-related cerebral atrophy and chronic small vessel ischemic changes 

redemonstrated.  No significant change from the prior CT.


Chest x-ray showed no acute cardiopulmonary process.


Troponin 2 negative magnesium 1.1





01/23/2019


Patient says that she is not feeling very well.  Still complains of chest 

tightness.  Patient was seen by cardiology.  Continued with Coreg and added 

valsartan 180 mg twice a day.


Continue with telemetry monitoring.  Magnesium level improved.


No nausea vomiting.  Tolerating oral diet.





Current medications reviewed.





Objective





- Vital Signs


Vital signs: 


                                   Vital Signs











Temp  98.1 F   01/23/20 19:54


 


Pulse  70   01/23/20 19:54


 


Resp  16   01/23/20 19:54


 


BP  142/76   01/23/20 19:54


 


Pulse Ox  96   01/23/20 19:54








                                 Intake & Output











 01/23/20 01/23/20 01/24/20





 06:59 18:59 06:59


 


Intake Total  710 20


 


Balance  710 20


 


Weight 88.8 kg  


 


Intake:   


 


  IV  50 20


 


    Invasive Line 1  30 


 


    Invasive Line 2  20 20


 


  Oral  660 


 


Other:   


 


  Voiding Method Toilet Toilet 


 


  # Voids 2 3 


 


  # Bowel Movements  0 














- Exam





PHYSICAL EXAMINATION: 


Patient is lying in the bed comfortably, no acute distress, awake alert and 

oriented.. 


HEENT: Normocephalic. Neck is supple. Pupils reactive. Nostrils clear. Oral 

cavity is moist. Ears reveal no drainage. 


Neck reveals no JVD, carotid bruits, or thyromegaly. 


CHEST EXAMINATION: Trachea is central. Symmetrical expansion.  Bibasilar di

minished air entry.  Lung fields clear to auscultation and percussion. 


CARDIAC: Normal S1, S2 with no gallops. No murmurs 


ABDOMEN: Soft. Bowel sounds normal. No organomegaly. No abdominal bruits. 


Extremities: reveal no edema.  No clubbing or cyanosis


Neurologically awake, alert, oriented x3 with well-coordinated movements.  No 

focal deficits noted


Skin: No rash or skin lesions. 


Psychiatric: Coperative.  Nonsuicidal


Musculoskeletal: No joint swelling or deformity.  Normal range of motion.





- Labs


CBC & Chem 7: 


                                 01/23/20 05:42





                                 01/24/20 10:49


Labs: 


                  Abnormal Lab Results - Last 24 Hours (Table)











  01/23/20 01/23/20 Range/Units





  05:42 05:42 


 


Plt Count  143 L   (150-450)  k/uL


 


Carbon Dioxide   31 H  (22-30)  mmol/L


 


Creatinine   1.05 H  (0.52-1.04)  mg/dL














Assessment and Plan


Assessment: 





Hypertensive urgency


Coronary artery disease with history of stent placement in November 2019


Peripheral vascular disease with history of aortofemoral bypass


History of neurostimulator implant due to chronic right hip/leg pain


Hyperlipidemia


GERD


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


Osteoarthritis


Objective sleep apnea on CPAP


History of gastric ulcer/Nielsen's esophagus


History of 5 cancerous polyps removed


DVT prophylaxis


Previous history of smoking


Morbid obesity BMI 38.2





Plan:


Patient was given a dose of IV hydralazine and labetalol in the ER.  Started 

back on Coreg and losartan as per her home dose.  Losartan changed to valsartan 

180 mg twice a day.  Continue to monitor blood pressure closely.  Serial EKGs 

and troponins negative.  Cardiology is following.  Further recommendations based

on the clinical course.





Time with Patient: Greater than 30

## 2020-01-25 NOTE — P.PN
Subjective


Progress Note Date: 01/25/20


Principal diagnosis: 





Uncontrolled hypertension





Patient is a 65 old female with a known history of coronary artery disease 

status post stent placement in November 2019, followed by repeat cardiac 

catheterization earlier this month, history of CVA/TIA, hypertension, 

hyperlipidemia, osteoarthritis, obstructive sleep apnea on CPAP, peripheral 

vascular disease status post aortofemoral bypass, history of neurostimulator 

implant and removal in 2014 and replaced in 2019, left common femoral 

endarterectomy and multiple other medical problems came to ER from her 

cardiologist's office due to uncontrolled hypertension and fluctuating blood 

pressures.  Patient has been having fluctuating blood pressure over the past 

several weeks.  Patient is currently on Coreg and losartan at home.  Patient 

blood pressure was elevated with systolic blood pressure greater than 200 mL Hg 

while in the clinic.  Patient was sent to Hospital ER for her pressure 

medication titration.  Patient otherwise denied any complaints of shortness of 

breath.  Noted to have some chest tightness otherwise.  No radiation or chest 

pain.  Patient was given a dose of IV hydralazine and labetalol in the ER.


Denied any complaints of nausea vomiting or abdominal pain.  Denied any headache

or dizziness or lightheadedness.  No weakness.  Denied any visual symptoms.


EKG showed normal sinus rhythm.


CT head showed no acute intracranial hemorrhage or midline shift.  There is mild

diffuse age-related cerebral atrophy and chronic small vessel ischemic changes 

redemonstrated.  No significant change from the prior CT.


Chest x-ray showed no acute cardiopulmonary process.


Troponin 2 negative magnesium 1.1





01/23/2019


Patient says that she is not feeling very well.  Still complains of chest 

tightness.  Patient was seen by cardiology.  Continued with Coreg and added 

valsartan 180 mg twice a day.


Continue with telemetry monitoring.  Magnesium level improved.


No nausea vomiting.  Tolerating oral diet.





01/24/2020


Patient is sitting up in the chair with family at the bedside and appears to be 

in no acute distress.  Patient states that her blood pressure is little more 

under control in the 140's systolic.  Patient states that she continues to have 

intermittent chest pain and shortness of breath with exertion or position 

changes.  Patient continues to state that she is itchy and having swelling and 

redness around the eyes due to a possible ALLERGIC reaction.  Benadryl is 

ordered as needed.  Patient denies any throat swelling or difficulty swallowing.

 She states that she has oxygen as needed at home and has been wearing 2 L via 

nasal cannula this entire admission stating that she has shortness of breath 

continuously.  Patient is quite anxious and is a high risk for readmissions.  

Cardiology is following the patient and recommended a renal ultrasound that is 

not routinely done and a prescription was provided for patient to have this done

outpatient.





01/25/2020


Patient says that she feels better today.  Blood pressure is fairly controlled 

with SBP been 150s.  Patient did have some chest discomfort this morning 

otherwise.  Currently denied any chest pain or shortness of breath.  No nausea 

vomiting or abdominal pain.


Patient is being continued on Coreg and losartan and Imdur.  Free cortisol level

is within normal limits.





Current medications reviewed.





Objective





- Vital Signs


Vital signs: 


                                   Vital Signs











Temp  98.1 F   01/25/20 19:41


 


Pulse  70   01/25/20 19:41


 


Resp  18   01/25/20 19:41


 


BP  150/70   01/25/20 19:41


 


Pulse Ox  94 L  01/25/20 19:41








                                 Intake & Output











 01/25/20 01/25/20 01/26/20





 06:59 18:59 06:59


 


Intake Total  1160 


 


Balance  1160 


 


Weight 89.1 kg  


 


Intake:   


 


  Oral  1160 


 


Other:   


 


  Voiding Method Toilet Toilet 


 


  # Voids 1 1 














- Exam





PHYSICAL EXAMINATION: 


Patient is lying in the bed comfortably, no acute distress, awake alert and 

oriented.. 


HEENT: Normocephalic. Neck is supple. Pupils reactive. Nostrils clear. Oral cav

ity is moist. Ears reveal no drainage. 


Neck reveals no JVD, carotid bruits, or thyromegaly. 


CHEST EXAMINATION: Trachea is central. Symmetrical expansion.  Bibasilar 

diminished air entry.  Lung fields clear to auscultation and percussion. 


CARDIAC: Normal S1, S2 with no gallops. No murmurs 


ABDOMEN: Soft. Bowel sounds normal. No organomegaly. No abdominal bruits. 


Extremities: reveal no edema.  No clubbing or cyanosis


Neurologically awake, alert, oriented x3 with well-coordinated movements.  No 

focal deficits noted


Skin: No rash or skin lesions. 


Psychiatric: Coperative.  Nonsuicidal


Musculoskeletal: No joint swelling or deformity.  Normal range of motion.





- Labs


CBC & Chem 7: 


                                 01/23/20 05:42





                                 01/24/20 10:49





Assessment and Plan


Assessment: 





Hypertensive urgency


Coronary artery disease with history of stent placement in November 2019


Peripheral vascular disease with history of aortofemoral bypass


History of neurostimulator implant due to chronic right hip/leg pain


Hyperlipidemia


GERD


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


Osteoarthritis


Objective sleep apnea on CPAP


History of gastric ulcer/Nielsen's esophagus


History of 5 cancerous polyps removed


DVT prophylaxis


Previous history of smoking


Morbid obesity BMI 38.2





Plan:


Patient was given a dose of IV hydralazine and labetalol in the ER.  Started 

back on Coreg and losartan as per her home dose.  Losartan changed to valsartan 

180 mg twice a day.  Continue to monitor blood pressure closely.  Serial EKGs 

and troponins negative.  Cardiology is following.  Further recommendations based

on the clinical course.





Time with Patient: Greater than 30

## 2020-01-25 NOTE — P.PN
Subjective


This is Zakiya Enamorado PA-C dictating a progress note on this patient


The patient was interviewed and examined by me as well as by Dr. Napoles


Case discussed with Dr. Napoles and he agrees with the plan of care





HPI/interval history


Patient is a 65-year-old female with a past medical history of CAD status post 

stenting, hypertension, dyslipidemia, and vascular disease who presented with 

complaints of chest pain and uncontrolled high blood pressure.  We have switched

her to valsartan and her blood pressure control is improving.  There was concern

that she was having a low heart rate however after reviewing the telemetry 

strips, there is under sensing.  She has not had any bradycardia.  Patient seen 

and examined resting in bed.  Continues to complain of intermittent chest 

discomfort.  Complains of a headache with the nitroglycerin.





EXAMINATION


Patient is afebrile, pulse in the 60s, respirations 12, blood pressure 142/80, 

oxygen saturation 97% on 2 L nasal cannula


Patient seen and examined resting in bed, appears comfortable


Lungs are clear to auscultation bilaterally


Heart is regular, no audible murmurs





REVIEW OF LABS, ECG


Troponin negative 3


Potassium 5.1, BUN 14, creatinine 0.81, cortisol 6





IMPRESSION / ASSESSMENT: 


Hypertensive urgency, blood pressure remains in the 140s over 70s 


Atypical chest discomfort, cardiac enzymes negative 3, EKG shows no acute ST or

T-wave abnormalities, continues to have intermittent discomfort


History of CAD status post stenting


History of peripheral vascular disease status post aortic surgery


Dyslipidemia


COPD


Former smoker





PLAN:


Start Nitropatch for chest discomfort


Continue aspirin, Plavix, Lipitor, Coreg 12.5 twice a day, isosorbide, valsartan


Continue to monitor BMP and telemetry
























































Objective





- Vital Signs


Vital signs: 


                                   Vital Signs











Temp  97.7 F   01/25/20 11:54


 


Pulse  65   01/25/20 12:00


 


Resp  12   01/25/20 12:00


 


BP  142/82   01/25/20 11:54


 


Pulse Ox  97   01/25/20 11:54








                                 Intake & Output











 01/24/20 01/25/20 01/25/20





 18:59 06:59 18:59


 


Intake Total 1200  540


 


Balance 1200  540


 


Weight  89.1 kg 


 


Intake:   


 


  IV 20  


 


    Invasive Line 2 20  


 


  Oral 1180  540


 


Other:   


 


  Voiding Method Toilet Toilet Toilet


 


  # Voids 1 1 1














- Labs


CBC & Chem 7: 


                                 01/23/20 05:42





                                 01/24/20 10:49

## 2020-01-26 NOTE — P.PN
Subjective


Progress Note Date: 01/26/20


Principal diagnosis: 





Uncontrolled hypertension





Patient is a 65 old female with a known history of coronary artery disease 

status post stent placement in November 2019, followed by repeat cardiac 

catheterization earlier this month, history of CVA/TIA, hypertension, 

hyperlipidemia, osteoarthritis, obstructive sleep apnea on CPAP, peripheral 

vascular disease status post aortofemoral bypass, history of neurostimulator 

implant and removal in 2014 and replaced in 2019, left common femoral 

endarterectomy and multiple other medical problems came to ER from her 

cardiologist's office due to uncontrolled hypertension and fluctuating blood 

pressures.  Patient has been having fluctuating blood pressure over the past 

several weeks.  Patient is currently on Coreg and losartan at home.  Patient 

blood pressure was elevated with systolic blood pressure greater than 200 mL Hg 

while in the clinic.  Patient was sent to Hospital ER for her pressure 

medication titration.  Patient otherwise denied any complaints of shortness of 

breath.  Noted to have some chest tightness otherwise.  No radiation or chest 

pain.  Patient was given a dose of IV hydralazine and labetalol in the ER.


Denied any complaints of nausea vomiting or abdominal pain.  Denied any headache

or dizziness or lightheadedness.  No weakness.  Denied any visual symptoms.


EKG showed normal sinus rhythm.


CT head showed no acute intracranial hemorrhage or midline shift.  There is mild

diffuse age-related cerebral atrophy and chronic small vessel ischemic changes 

redemonstrated.  No significant change from the prior CT.


Chest x-ray showed no acute cardiopulmonary process.


Troponin 2 negative magnesium 1.1





01/23/2019


Patient says that she is not feeling very well.  Still complains of chest 

tightness.  Patient was seen by cardiology.  Continued with Coreg and added 

valsartan 180 mg twice a day.


Continue with telemetry monitoring.  Magnesium level improved.


No nausea vomiting.  Tolerating oral diet.





01/24/2020


Patient is sitting up in the chair with family at the bedside and appears to be 

in no acute distress.  Patient states that her blood pressure is little more 

under control in the 140's systolic.  Patient states that she continues to have 

intermittent chest pain and shortness of breath with exertion or position 

changes.  Patient continues to state that she is itchy and having swelling and 

redness around the eyes due to a possible ALLERGIC reaction.  Benadryl is 

ordered as needed.  Patient denies any throat swelling or difficulty swallowing.

 She states that she has oxygen as needed at home and has been wearing 2 L via 

nasal cannula this entire admission stating that she has shortness of breath 

continuously.  Patient is quite anxious and is a high risk for readmissions.  

Cardiology is following the patient and recommended a renal ultrasound that is 

not routinely done and a prescription was provided for patient to have this done

outpatient.





01/25/2020


Patient says that she feels better today.  Blood pressure is fairly controlled 

with SBP been 150s.  Patient did have some chest discomfort this morning 

otherwise.  Currently denied any chest pain or shortness of breath.  No nausea 

vomiting or abdominal pain.


Patient is being continued on Coreg and losartan and Imdur.  Free cortisol level

is within normal limits.





01/26/2020


Patient says that she's not feeling well today.  Blood pressure is still 

elevated.  Coreg dose increased to 18.75 mg twice daily.  Cardiology is 

following closely.


Patient also complains of intermittent chest pains.  Currently on Nitropaste as 

well.


No complaints of dizziness.  No nausea vomiting or abdominal pain.  Tolerating 

oral diet.  Anticipate discharge in next 24-48 hours.





Current medications reviewed.





Objective





- Vital Signs


Vital signs: 


                                   Vital Signs











Temp  97.8 F   01/26/20 12:16


 


Pulse  65   01/26/20 12:16


 


Resp  16   01/26/20 12:16


 


BP  148/84   01/26/20 12:16


 


Pulse Ox  94 L  01/26/20 12:16








                                 Intake & Output











 01/25/20 01/26/20 01/26/20





 18:59 06:59 18:59


 


Intake Total 1160  600


 


Balance 1160  600


 


Weight  89 kg 


 


Intake:   


 


  Oral 1160  600


 


Other:   


 


  Voiding Method Toilet Toilet Toilet


 


  # Voids 1 3 1














- Exam





PHYSICAL EXAMINATION: 


Patient is lying in the bed comfortably, no acute distress, awake alert and 

oriented.. 


HEENT: Normocephalic. Neck is supple. Pupils reactive. Nostrils clear. Oral 

cavity is moist. Ears reveal no drainage. 


Neck reveals no JVD, carotid bruits, or thyromegaly. 


CHEST EXAMINATION: Trachea is central. Symmetrical expansion.  Bibasilar 

diminished air entry.  Lung fields clear to auscultation and percussion. 


CARDIAC: Normal S1, S2 with no gallops. No murmurs 


ABDOMEN: Soft. Bowel sounds normal. No organomegaly. No abdominal bruits. 


Extremities: reveal no edema.  No clubbing or cyanosis


Neurologically awake, alert, oriented x3 with well-coordinated movements.  No 

focal deficits noted


Skin: No rash or skin lesions. 


Psychiatric: Coperative.  Nonsuicidal


Musculoskeletal: No joint swelling or deformity.  Normal range of motion.





- Labs


CBC & Chem 7: 


                                 01/26/20 06:13





                                 01/26/20 06:13


Labs: 


                  Abnormal Lab Results - Last 24 Hours (Table)











  01/26/20 01/26/20 Range/Units





  06:13 06:13 


 


Plt Count  132 L   (150-450)  k/uL


 


BUN   20 H  (7-17)  mg/dL


 


Glucose   117 H  (74-99)  mg/dL














Assessment and Plan


Assessment: 





Hypertensive urgency


Coronary artery disease with history of stent placement in November 2019


Peripheral vascular disease with history of aortofemoral bypass


History of neurostimulator implant due to chronic right hip/leg pain


Hyperlipidemia


GERD


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


Osteoarthritis


Objective sleep apnea on CPAP


History of gastric ulcer/Nielsen's esophagus


History of 5 cancerous polyps removed


DVT prophylaxis


Previous history of smoking


Morbid obesity BMI 38.2





Plan:


Patient was given a dose of IV hydralazine and labetalol in the ER.  Started 

back on Coreg and losartan as per her home dose.  Losartan changed to valsartan 

180 mg twice a day.  Coreg dose increased.  Continue to monitor blood pressure 

closely.  Serial EKGs and troponins negative.  Cardiology is following.  Further

recommendations based on the clinical course.





Time with Patient: Greater than 30

## 2020-01-26 NOTE — P.PN
Subjective


This is Zakiya Enamorado PA-C dictating a progress note on this patient


The patient was interviewed and examined by me as well as by Dr. Napoles


Case discussed with Dr. Napoles and he agrees with the plan of care





HPI/interval history


Patient is a 65-year-old female with a past medical history of CAD status post 

stenting, hypertension, dyslipidemia, and vascular disease who presented with 

complaints of chest pain and uncontrolled high blood pressure.  We have switched

her to valsartan and her blood pressure control is improving but remains 

elevated.  Patient seen and examined resting in bed.  States she had another 

episode of chest discomfort overnight.  Troponin was drawn again and again was 

negative.  Continues to have shortness of breath on exertion





EXAMINATION


Patient is afebrile, pulse in the 60s, blood pressure 148/84, oxygen saturation 

94% on 2 L nasal cannula


Patient seen and examined resting in bed, appears comfortable


Lungs are clear to auscultation bilaterally


Heart is regular, no audible murmurs


She does have some tenderness to palpation in the left anterior chest





REVIEW OF LABS, ECG


WBC was 4.6, hemoglobin 12.9, platelets 132, potassium 4.9, BUN 20, creatinine 

0.9


Troponins negative 4





IMPRESSION / ASSESSMENT: 


Hypertensive urgency, blood pressure remains elevated


Atypical chest discomfort, cardiac enzymes negative 4, EKG shows no acute ST or

T-wave abnormalities, continues to have intermittent discomfort, tenderness to 

palpation, likely musculoskeletal


History of CAD status post stenting


History of peripheral vascular disease status post aortic surgery


Dyslipidemia


COPD


Former smoker





PLAN:


Increase Coreg to 18.75 mg daily


Continue aspirin and statins


And Plavix


Continue valsartan 160 mg twice a day








Objective





- Vital Signs


Vital signs: 


                                   Vital Signs











Temp  97.8 F   01/26/20 12:16


 


Pulse  65   01/26/20 12:16


 


Resp  16   01/26/20 12:16


 


BP  148/84   01/26/20 12:16


 


Pulse Ox  94 L  01/26/20 12:16








                                 Intake & Output











 01/25/20 01/26/20 01/26/20





 18:59 06:59 18:59


 


Intake Total 1160  480


 


Balance 1160  480


 


Weight  89 kg 


 


Intake:   


 


  Oral 1160  480


 


Other:   


 


  Voiding Method Toilet Toilet Toilet


 


  # Voids 1 3 1














- Labs


CBC & Chem 7: 


                                 01/26/20 06:13





                                 01/26/20 06:13


Labs: 


                  Abnormal Lab Results - Last 24 Hours (Table)











  01/26/20 01/26/20 Range/Units





  06:13 06:13 


 


Plt Count  132 L   (150-450)  k/uL


 


BUN   20 H  (7-17)  mg/dL


 


Glucose   117 H  (74-99)  mg/dL

## 2020-01-27 NOTE — P.PN
Subjective


Progress Note Date: 01/27/20


Principal diagnosis: 





Patient is a 65 old female with a known history of coronary artery disease 

status post stent placement in November 2019, followed by repeat cardiac 

catheterization earlier this month, history of CVA/TIA, hypertension, 

hyperlipidemia, osteoarthritis, obstructive sleep apnea on CPAP, peripheral 

vascular disease status post aortofemoral bypass, history of neurostimulator 

implant and removal in 2014 and replaced in 2019, left common femoral 

endarterectomy and multiple other medical problems came to ER from her 

cardiologist's office due to uncontrolled hypertension and fluctuating blood 

pressures.  Patient has been having fluctuating blood pressure over the past 

several weeks.  Patient is currently on Coreg and losartan at home.  Patient 

blood pressure was elevated with systolic blood pressure greater than 200 mL Hg 

while in the clinic.  Patient was sent to Hospital ER for her pressure 

medication titration.  Patient otherwise denied any complaints of shortness of 

breath.  Noted to have some chest tightness otherwise.  No radiation or chest 

pain.  Patient was given a dose of IV hydralazine and labetalol in the ER.


Denied any complaints of nausea vomiting or abdominal pain.  Denied any headache

or dizziness or lightheadedness.  No weakness.  Denied any visual symptoms.


EKG showed normal sinus rhythm.


CT head showed no acute intracranial hemorrhage or midline shift.  There is mild

diffuse age-related cerebral atrophy and chronic small vessel ischemic changes 

redemonstrated.  No significant change from the prior CT.


Chest x-ray showed no acute cardiopulmonary process.


Troponin 2 negative magnesium 1.1





01/24/2020


Patient is sitting up in the chair with family at the bedside and appears to be 

in no acute distress.  Patient states that her blood pressure is little more 

under control in the 140's systolic.  Patient states that she continues to have 

intermittent chest pain and shortness of breath with exertion or position 

changes.  Patient continues to state that she is itchy and having swelling and 

redness around the eyes due to a possible ALLERGIC reaction.  Benadryl is 

ordered as needed.  Patient denies any throat swelling or difficulty swallowing.

 She states that she has oxygen as needed at home and has been wearing 2 L via 

nasal cannula this entire admission stating that she has shortness of breath 

continuously.  Patient is quite anxious and is a high risk for readmissions.  

Cardiology is following the patient and recommended a renal ultrasound that is 

not routinely done and a prescription was provided for patient to have this done

outpatient.





01/25/2020


Patient says that she feels better today.  Blood pressure is fairly controlled 

with SBP been 150s.  Patient did have some chest discomfort this morning 

otherwise.  Currently denied any chest pain or shortness of breath.  No nausea 

vomiting or abdominal pain.


Patient is being continued on Coreg and losartan and Imdur.  Free cortisol level

is within normal limits.





01/26/2020


Patient says that she's not feeling well today.  Blood pressure is still 

elevated.  Coreg dose increased to 18.75 mg twice daily.  Cardiology is 

following closely.


Patient also complains of intermittent chest pains.  Currently on Nitropaste as 

well.


No complaints of dizziness.  No nausea vomiting or abdominal pain.  Tolerating 

oral diet.  Anticipate discharge in next 24-48 hours.





01/27/2020


Patient is sitting up in the bed and appears to be in no acute distress.  

Patient states that her shortness of breath has not worsened but continues to be

short of breath upon exertion of walking to the bathroom.  Cardiology is 

following closely and have made adjustments to her valsartan and Coreg and will 

continue at this time.  Patient awaiting a renal artery ultrasound in the 

morning as there was only one specific tech that could perform the procedure.  

Patient is scheduled for the morning.  Currently patient denies any chest pain, 

worsening shortness of breath, or palpitations.  Patient is afebrile.  Patient 

denies any vomiting but had some mild intermittent nausea this morning.  Zofran 

was ordered by mouth.








Objective





- Vital Signs


Vital signs: 


                                   Vital Signs











Temp  97.0 F L  01/27/20 12:00


 


Pulse  66   01/27/20 12:00


 


Resp  20   01/27/20 04:00


 


BP  152/82   01/27/20 12:00


 


Pulse Ox  95   01/27/20 12:00








                                 Intake & Output











 01/26/20 01/27/20 01/27/20





 18:59 06:59 18:59


 


Intake Total 1300  230


 


Output Total  240 0


 


Balance 1300 -240 230


 


Weight  88.9 kg 


 


Intake:   


 


  Oral 1300  230


 


Output:   


 


  Urine  240 0


 


Other:   


 


  Voiding Method Toilet Toilet 


 


  # Voids 1  














- Exam





Patient is lying in the bed comfortably, no acute distress, awake alert and o

riented.. 


HEENT: Normocephalic. Neck is supple. Pupils reactive. Nostrils clear. Oral 

cavity is moist. Ears reveal no drainage. 


Neck reveals no JVD, carotid bruits, or thyromegaly. 


CHEST EXAMINATION: Trachea is central. Symmetrical expansion.  Bibasilar 

diminished air entry.  Lung fields clear to auscultation and percussion. 


CARDIAC: Normal S1, S2 with no gallops. No murmurs 


ABDOMEN: Soft. Bowel sounds normal. No organomegaly. No abdominal bruits. 


Extremities: reveal no edema.  No clubbing or cyanosis


Neurologically awake, alert, oriented x3 with well-coordinated movements.  No 

focal deficits noted


Skin: No rash or skin lesions. 


Psychiatric: Cooperative.  Non-suicidal


Musculoskeletal: No joint swelling or deformity.  Normal range of motion.





- Labs


CBC & Chem 7: 


                                 01/26/20 06:13





                                 01/26/20 06:13





Assessment and Plan


Assessment: 





Hypertensive urgency


Coronary artery disease with history of stent placement in November 2019


Peripheral vascular disease with history of aortofemoral bypass


History of neurostimulator implant due to chronic right hip/leg pain


Hyperlipidemia


GERD


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


Osteoarthritis


Objective sleep apnea on CPAP


History of gastric ulcer/Nielsen's esophagus


History of 5 cancerous polyps removed


DVT prophylaxis


Previous history of smoking


Morbid obesity BMI 38.2





Plan:


Valsartan is being increased to 320mg to be spread throughout the day and Coreg 

dose increased to 25 mg twice daily.  Continue to monitor blood pressure 

closely. Cardiology is following.  Patient is scheduled to undergo renal artery 

ultrasound tomorrow Further recommendations based on the clinical course.  

Possible discharge in 24 hours.

## 2020-01-27 NOTE — P.PN
Subjective


This is Zakiya Enamorado PA-C dictating a progress note on this patient


The patient was interviewed and examined by me as well as by Dr. Napoles


Case discussed with Dr. Napoles and he agrees with the plan of care





HPI/interval history


Patient is a 65-year-old female with a past medical history of CAD status post 

stenting, hypertension, dyslipidemia, and vascular disease who presented with 

complaints of chest pain and uncontrolled high blood pressure.  Yesterday we 

increased her Coreg to 18.75 mg twice daily and her blood pressure remains 

elevated.  Patient seen and examined resting in bed.  She did not have any more 

episodes of chest pain overnight.





EXAMINATION


Patient is afebrile, pulse in the 60s, respirations 18, blood pressure in the 

160s over 80s, oxygen saturation 95% on 2 L nasal cannula


Patient seen and examined resting in bed, appears comfortable


Lungs are clear to auscultation bilaterally


Heart is regular, no audible murmurs





REVIEW OF LABS, ECG


WBC 4.6, hemoglobin 12.9, platelets 132, potassium 4.5, BUN 20, creatinine 0.9


Troponins negative 5





IMPRESSION / ASSESSMENT: 


Hypertensive urgency, blood pressure remains elevated


Atypical chest discomfort, cardiac enzymes negative 5, EKG shows no acute ST or

T-wave abnormalities, no further episodes of chest pain overnight


History of CAD status post stenting


History of peripheral vascular disease status post aortic surgery


Dyslipidemia


COPD


Former smoker





PLAN:


Increase Coreg to 25 mg twice daily and valsartan 320 mg at noon spreadout her 

blood pressure pills


She is awaiting a renal artery ultrasound








Objective





- Vital Signs


Vital signs: 


                                   Vital Signs











Temp  97.0 F L  01/27/20 12:00


 


Pulse  66   01/27/20 12:00


 


Resp  20   01/27/20 04:00


 


BP  152/82   01/27/20 12:00


 


Pulse Ox  95   01/27/20 12:00








                                 Intake & Output











 01/26/20 01/27/20 01/27/20





 18:59 06:59 18:59


 


Intake Total 1300  230


 


Output Total  240 0


 


Balance 1300 -240 230


 


Weight  88.9 kg 


 


Intake:   


 


  Oral 1300  230


 


Output:   


 


  Urine  240 0


 


Other:   


 


  Voiding Method Toilet Toilet 


 


  # Voids 1  














- Labs


CBC & Chem 7: 


                                 01/26/20 06:13





                                 01/26/20 06:13

## 2020-01-28 NOTE — P.PN
Subjective


This is Zakiya Enamorado PA-C dictating a progress note on this patient


The patient was interviewed and examined by me as well as by Dr. Napoles


Case discussed with Dr. Napoles and he agrees with the plan of care





HPI/interval history


Patient is a 65-year-old female with a past medical history of CAD status post 

stenting, hypertension, dyslipidemia, and vascular disease who presented with 

complaints of chest pain and uncontrolled high blood pressure.  Yesterday we 

increased her Coreg to 25 mg twice a day and switched her valsartan to 320 mg at

noon.  Her blood pressure seems to have been better controlled, was in the 130s 

over 60s overnight.  Patient seen and examined resting in bed.  Complaining of a

headache.  She did not have any more episodes of chest pain overnight.





EXAMINATION


Patient is afebrile, pulse in the 60s, respirations 16, blood pressure in the 

130s over 60s, oxygen saturation 96% on 2 L nasal cannula


Patient seen and examined resting in bed, appears comfortable


Lungs are clear to auscultation bilaterally


Heart is regular, no audible murmurs





REVIEW OF LABS, ECG


No new labs this morning


Troponins negative 5





IMPRESSION / ASSESSMENT: 


Hypertensive urgency, blood pressure has improved


Atypical chest discomfort, cardiac enzymes negative 5, EKG shows no acute ST or

T-wave abnormalities, coronary angiogram recently showing patent stent to the 

RCA and mild disease involving the left coronary system


History of CAD status post stenting


History of peripheral vascular disease status post aortic surgery


Dyslipidemia


COPD


Former smoker





PLAN:


Continue Coreg to 25 mg twice daily and valsartan 320 mg at noon


From a cardiac standpoint she is cleared for discharge to follow-up with her 

primary cardiologist


We had ordered a renal artery Doppler however she recently had abdominal aorta 

CT showing bilateral arterial flow to the renal arteries and patent aortoiliac 

bypass graft so there is no need for the renal artery Doppler








Objective





- Vital Signs


Vital signs: 


                                   Vital Signs











Temp  98.0 F   01/28/20 04:00


 


Pulse  84   01/28/20 08:00


 


Resp  16   01/28/20 11:30


 


BP  160/80   01/28/20 08:00


 


Pulse Ox  96   01/28/20 04:00








                                 Intake & Output











 01/27/20 01/28/20 01/28/20





 18:59 06:59 18:59


 


Intake Total 790 780 


 


Output Total 0 600 


 


Balance 790 180 


 


Weight  88.9 kg 


 


Intake:   


 


  Oral 790 780 


 


Output:   


 


  Urine 0 600 


 


Other:   


 


  Voiding Method Toilet Toilet Toilet














- Labs


CBC & Chem 7: 


                                 01/26/20 06:13





                                 01/26/20 06:13

## 2020-01-28 NOTE — P.DS
Providers


Date of admission: 


01/22/20 14:12





Expected date of discharge: 01/28/20


Attending physician: 


Sudhir Washington





Consults: 





                                        





01/22/20 14:13


Consult Physician Routine 


   Consulting Provider: Chris Palacios


   Consult Reason/Comments: Hypertension


   Do you want consulting provider notified?: Yes











Primary care physician: 


[facility] Course: 


Final diagnosis





Hypertensive urgency


Coronary artery disease with history of stent placement in November 2019


Peripheral vascular disease with history of aortofemoral bypass


History of neurostimulator implant due to chronic right hip/leg pain


Hyperlipidemia


GERD


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


Osteoarthritis


Objective sleep apnea on CPAP


History of gastric ulcer/Nielsen's esophagus


History of 5 cancerous polyps removed


DVT prophylaxis


Previous history of smoking


Morbid obesity BMI 38.2








Discharge disposition


Patient is being discharged in a stable condition with guarded prognosis to home

and will follow-up with primary care provider upon discharge.  Patient will also

follow-up with her cardiologist in the outpatient setting in one week.  Patient 

will continue on valsartan 320 mg daily and Coreg 25 mg twice daily for better 

blood pressure control.  Total time taken is 35 minutes.





History of present illness





Patient is a 65 old female with a known history of coronary artery disease 

status post stent placement in November 2019, followed by repeat cardiac 

catheterization earlier this month, history of CVA/TIA, hypertension, 

hyperlipidemia, osteoarthritis, obstructive sleep apnea on CPAP, peripheral 

vascular disease status post aortofemoral bypass, history of neurostimulator 

implant and removal in 2014 and replaced in 2019, left common femoral 

endarterectomy and multiple other medical problems came to ER from her 

cardiologist's office due to uncontrolled hypertension and fluctuating blood 

pressures.  Patient has been having fluctuating blood pressure over the past 

several weeks.  Patient is currently on Coreg and losartan at home.  Patient 

blood pressure was elevated with systolic blood pressure greater than 200 mL Hg 

while in the clinic.  Patient was sent to Hospital ER for her pressure medic

ation titration.  Patient otherwise denied any complaints of shortness of 

breath.  Noted to have some chest tightness otherwise.  No radiation or chest 

pain.  Patient was given a dose of IV hydralazine and labetalol in the ER.


Denied any complaints of nausea vomiting or abdominal pain.  Denied any headache

or dizziness or lightheadedness.  No weakness.  Denied any visual symptoms.


EKG showed normal sinus rhythm.


CT head showed no acute intracranial hemorrhage or midline shift.  There is mild

diffuse age-related cerebral atrophy and chronic small vessel ischemic changes 

redemonstrated.  No significant change from the prior CT.


Chest x-ray showed no acute cardiopulmonary process.


Troponin 2 negative magnesium 1.1





01/28/2020





Patient will be discharged today and follow-up with her primary care provider 

along with cardiology in the outpatient setting this week.  Patient will 

continue with valsartan 320 mg daily along with Coreg 25 mg twice daily for 

better blood pressure control.  Discussed with the patient about monitoring 

blood pressure and keeping a diary for primary care follow-up.  Currently 

patient denies any chest pain, shortness of breath, or palpitations.  Patient is

afebrile.  Patient denies any nausea or vomiting and is tolerating diet.  

Patient was scheduled to undergo a renal artery ultrasound but was canceled per 

cardiology recommendations as she had a recent abdominal aorta CT showing 

bilateral arterial flow to the renal arteries and patent aortoiliac bypass 

graft.  Patient verbalized understanding of this treatment plan and agrees with 

this plan.  Patient will be discharged today.





On exam vital signs are stable.  Temp is 98F, pulse is 64, respirations are 16,

blood pressure is 134/60, oxygen saturation is 96% on 2 L via nasal cannula.  

Patient states that she intermittently wears 2 L of oxygen at home as needed.  

Cardio S1, S2 are present.  Respiratory system is clear to auscultation.  

Abdomen is soft and nontender.  Nervous system shows no focal deficits.





Please refer to medication reconciliation sheet for a list of medications.











Patient Condition at Discharge: Stable





Plan - Discharge Summary


Discharge Rx Participant: No


New Discharge Prescriptions: 


New


   Carvedilol [Coreg*] 25 mg PO AC-BID 30 Days #120 tab


   Valsartan [Diovan] 320 mg PO DAILY@1200 30 Days #60 tab





Continue


   Sertraline [Zoloft] 100 mg PO DAILY


   Ferrous Sulfate [Iron (65 MG Elemental)] 325 mg PO DAILY


   Cholecalciferol [Vitamin D3 (25 Mcg = 1000 Iu)] 1,000 unit PO DAILY


   Pantoprazole Sodium [Protonix] 40 mg PO DAILY


   Cetirizine HCl [Zyrtec] 10 mg PO DAILY


   Aspirin 81 mg PO DAILY #30 chew


   Atorvastatin [Lipitor] 80 mg PO DAILY #30 tab


   Nitroglycerin Sl Tabs [Nitrostat] 0.4 mg SUBLINGUAL Q5M PRN #25 tab


     PRN Reason: Chest Pain


   Clopidogrel [Plavix] 75 mg PO DAILY #30 tab


   Isosorbide Mononitrate ER [Imdur] 30 mg PO DAILY 30 Days #30 tab.er.24h


   Fluticasone Propionate 110 Mcg [Flovent 110 Mcg Inhaler (Bulk)] 1 puff 

INHALATION RT-BID 30 Days #1 puff


   HYDROcodone/APAP 5-325MG [Norco 5-325] 2 tab PO TID PRN


     PRN Reason: Pain





Discontinued


   Carvedilol [Coreg] 12.5 mg PO AC-BID


Discharge Medication List





Cetirizine HCl [Zyrtec] 10 mg PO DAILY 11/17/19 [History]


Cholecalciferol [Vitamin D3 (25 Mcg = 1000 Iu)] 1,000 unit PO DAILY 11/17/19 

[History]


Ferrous Sulfate [Iron (65 MG Elemental)] 325 mg PO DAILY 11/17/19 [History]


Pantoprazole Sodium [Protonix] 40 mg PO DAILY 11/17/19 [History]


Sertraline [Zoloft] 100 mg PO DAILY 11/17/19 [History]


Aspirin 81 mg PO DAILY #30 chew 11/20/19 [Rx]


Atorvastatin [Lipitor] 80 mg PO DAILY #30 tab 11/20/19 [Rx]


Clopidogrel [Plavix] 75 mg PO DAILY #30 tab 11/20/19 [Rx]


Nitroglycerin Sl Tabs [Nitrostat] 0.4 mg SUBLINGUAL Q5M PRN #25 tab 11/20/19 

[Rx]


Fluticasone Propionate 110 Mcg [Flovent 110 Mcg Inhaler (Bulk)] 1 puff INH

ALATION RT-BID 30 Days #1 puff 01/03/20 [Rx]


Isosorbide Mononitrate ER [Imdur] 30 mg PO DAILY 30 Days #30 tab.er.24h 01/03/20

 [Rx]


HYDROcodone/APAP 5-325MG [Norco 5-325] 2 tab PO TID PRN 01/22/20 [History]


Carvedilol [Coreg*] 25 mg PO AC-BID 30 Days #120 tab 01/28/20 [Rx]


Valsartan [Diovan] 320 mg PO DAILY@1200 30 Days #60 tab 01/28/20 [Rx]








Follow up Appointment(s)/Referral(s): 


Chris Palacios MD [Family Provider] - 02/06/20 1:30 pm (Thursday)


[facility] [Primary Care Provider] - 01/30/20 3:00 pm (Thursday with Yury TORRES)


Patient Instructions/Handouts:  Low-Sodium Diet (DC), Hypertension (DC), 

Hypomagnesemia (DC)


Activity/Diet/Wound Care/Special Instructions: 


Will need indigent funds at discharge - indigent form on chart, send to pharmacy

at discharge.


Fax med list to [facility] - [phone]





Patient needs renal US- Appt made for tuesday, 1/28 @ 9AM





Follow-up with primary care provider upon discharge


Activity Limited until follow-up


Follow-up with cardiology in one week


Continue current diet


Discharge Disposition: HOME SELF-CARE


Care Plan Goals (MU): 


Check

## 2020-03-03 NOTE — XR
EXAMINATION TYPE: XR chest 1V

 

DATE OF EXAM: 12/28/2019

 

COMPARISON: 12/26/2019

 

HISTORY:

 hypertension.

 

 

 

TECHNIQUE: 

 

FINDINGS:

There is no heart failure nor confluent pneumonic infiltrate. Costophrenic angles are clear. There ar
e chest leads. Bony thorax is intact. There is neural stimulator in the thoracic spine.

 

 

 

IMPRESSION: No active cardiopulmonary disease. No adverse change. 03-Mar-2020 22:57

## 2020-06-08 NOTE — ED
Abdominal Pain HPI





- General


Chief Complaint: Abdominal Pain


Stated Complaint: abd pain, diarrhea


Time Seen by Provider: 20 14:10


Source: patient, RN notes reviewed, old records reviewed


Mode of arrival: ambulatory


Limitations: no limitations





- History of Present Illness


Initial Comments: 





This is a 65-year-old female DF for evaluation patient Dese for evaluation 

regards to abdominal pain history of ulcers multiple ulcers with multiple 

surgeries.  Patient has had surgery on her aorta from stem to pelvis.  Patient 

states she's had cramping lower abdominal pain for weeks now if not months.  

Patient is concerned that the patient has not the pain is not getting better she

has been trying to get in to see GI Lasix seen in the past.  No recent travel 

history no sick contacts no fevers no family members with similar complaints no 

modifying factors for symptoms at home


MD Complaint: abdominal pain


-: days(s)


Location: diffuse, periumbilical, epigastric, suprapubic


Radiation: epigastric, suprapubic


Migration to: no migration


Severity: moderate


Severity scale (1-10): 4


Quality: aching


Consistency: constant


Improves With: nothing


Worsens With: nothing


Context: sick contacts


Associated Symptoms: nausea, vomiting





- Related Data


                                Home Medications











 Medication  Instructions  Recorded  Confirmed


 


Cetirizine HCl [Zyrtec] 10 mg PO DAILY 19


 


Pantoprazole Sodium [Protonix] 40 mg PO DAILY 19


 


Sertraline [Zoloft] 100 mg PO DAILY 19


 


Atorvastatin [Lipitor] 80 mg PO TUFR 20


 


Furosemide [Lasix] 20 mg PO DAILY 20


 


Potassium Chloride [K-Tab ER] 20 meq PO DAILY 20


 


Valsartan [Diovan] 320 mg PO DAILY@1200 20








                                  Previous Rx's











 Medication  Instructions  Recorded


 


Clopidogrel [Plavix] 75 mg PO DAILY #30 tab 19


 


Nitroglycerin Sl Tabs [Nitrostat] 0.4 mg SUBLINGUAL Q5M PRN #25 tab 19











                                    Allergies











Allergy/AdvReac Type Severity Reaction Status Date / Time


 


albuterol Allergy  Rash/Hives Verified 20 15:13


 


cortisone Allergy  Swelling/Pain Verified 20 15:13





   at inj site  


 


erythromycin base Allergy  Rash/Hives Verified 20 15:13


 


fentanyl [From Duragesic] Allergy  Unknown Verified 20 15:13





   Childhood  


 


hydralazine Allergy  Rash/Hives Verified 20 15:13


 


Iodinated Contrast Media Allergy  Rash/Hives-EVEN Verified 20 15:13





   WITH PREP  


 


iodine Allergy  Anaphylaxis Verified 20 15:13


 


lisinopril Allergy  Swelling Verified 20 15:13


 


modafinil Allergy  Unknown Verified 20 15:13


 


nystatin Allergy  Rash/Hives Verified 20 15:13


 


procaine [From Novocain] Allergy  Swelling Verified 20 15:13


 


vancomycin Allergy  Rash/Hives Verified 20 15:13


 


amlodipine AdvReac  Nausea & Verified 20 15:13





   Vomiting &  





   Diarrhea  


 


aspirin AdvReac  Abdominal Verified 20 15:13





   Pain  


 


atorvastatin [From Lipitor] AdvReac  MUSCLE PAIN Verified 20 15:13


 


cimetidine [From Tagamet] AdvReac  dizziness Verified 20 15:13


 


isosorbide AdvReac  Nausea & Verified 20 15:13





   Vomiting &  





   Diarrhea  


 


labetalol AdvReac  Nausea & Verified 20 15:13





   Vomiting &  





   Diarrhea  


 


metoprolol AdvReac  Diarrhea Verified 20 15:13


 


morphine AdvReac  Diarrhea Verified 20 15:13


 


oxcarbazepine AdvReac  hair loss Verified 20 15:13





[From Trileptal]     


 


paroxetine [From Paxil] AdvReac  tremors Verified 20 15:13


 


rosuvastatin [From Crestor] AdvReac  MUSCLE PAIN Verified 20 15:13


 


simvastatin [From Zocor] AdvReac  MUSCLE PAIN Verified 20 15:13














Review of Systems


ROS Statement: 


Those systems with pertinent positive or pertinent negative responses have been 

documented in the HPI.





ROS Other: All systems not noted in ROS Statement are negative.





Past Medical History


Past Medical History: Coronary Artery Disease (CAD), Cancer, CVA/TIA, 

GERD/Reflux, Hyperlipidemia, Hypertension, Liver Disease, Osteoarthritis (OA), 

Sleep Apnea/CPAP/BIPAP, Vascular Disorder


Additional Past Medical History / Comment(s): Pt recently admitted to [facility] on 

19 with htn urgency, exacerbation COPD, tracheobronchitis, acute kidney 

injury.    Other hx:  2007 Aortic thrombectomy/aortofemoral bypass/pt in 

coma/trach x 6 weeks, R hip to R toes nerve damage after 6 weeks comatose, past 

chronic pain to R hip/leg but has pain stimulator which has greatly helped, 

gastric ulcers, Nielsen's esophagus, hemorrhoids, 5 cancerous colon polyps 

removed, fatty liver, iron anemia, MIRELA with Cpap/O2 use, arthritis in feet.


History of Any Multi-Drug Resistant Organisms: None Reported


Past Surgical History: Cholecystectomy, Heart Catheterization, Heart 

Catheterization With Stent, Hysterectomy, Orthopedic Surgery, Tonsillectomy


Additional Past Surgical History / Comment(s): Bilateral heel bone 

spurs/bilateral feet fusions to lower arches, bone spurs removed from inside 

bilateral feet,  stent in R leg with bypass to left leg,  aortic 

thrombectomy/aortofemoral bypass, R knee arthroscopic surgery,  

neurostimulator implant,  neruostimulator paddle moved, 2019 stimulator 

replaced and relocation stimulator battery, 2017 L common femoral endartectomy, 

EGDs, colonoscopies/cancerous polypectomies.


Past Anesthesia/Blood Transfusion Reactions: No Reported Reaction


Date of Last Stent Placement:: 2019


Past Psychological History: Anxiety, Depression


Smoking Status: Former smoker


Past Alcohol Use History: None Reported


Past Drug Use History: None Reported





- Past Family History


  ** Mother


Family Medical History: No Reported History


Additional Family Medical History / Comment(s): Mother was an alcoholic and 

at the age of 36 yrs.





  ** Father


Family Medical History: Cancer


Additional Family Medical History / Comment(s): Father had jaw/throat cancer.  

He was a smoker.





General Exam


Limitations: no limitations


General appearance: alert, in no apparent distress


Head exam: Present: atraumatic, normocephalic, normal inspection


Eye exam: Present: normal appearance, PERRL, EOMI.  Absent: scleral icterus, 

conjunctival injection, periorbital swelling


ENT exam: Present: normal exam, mucous membranes moist


Neck exam: Present: normal inspection.  Absent: tenderness, meningismus, 

lymphadenopathy


Respiratory exam: Present: normal lung sounds bilaterally.  Absent: respiratory 

distress, wheezes, rales, rhonchi, stridor


Cardiovascular Exam: Present: regular rate, normal rhythm, normal heart sounds. 

Absent: systolic murmur, diastolic murmur, rubs, gallop, clicks


GI/Abdominal exam: Present: soft, normal bowel sounds.  Absent: distended, 

tenderness, guarding, rebound, rigid


Extremities exam: Present: normal inspection, full ROM, normal capillary refill.

 Absent: tenderness, pedal edema, joint swelling, calf tenderness


Back exam: Present: normal inspection


Neurological exam: Present: alert, oriented X3, CN II-XII intact


Psychiatric exam: Present: normal affect, normal mood


Skin exam: Present: warm, dry, intact, normal color.  Absent: rash





Course


                                   Vital Signs











  20





  14:04 15:00 15:30


 


Temperature 98 F  


 


Pulse Rate 96 87 


 


Respiratory 16 18 





Rate   


 


Blood Pressure 179/89 189/88 199/91


 


O2 Sat by Pulse 96 93 L 97





Oximetry   














  20





  16:00 16:30


 


Temperature  


 


Pulse Rate  88


 


Respiratory  16





Rate  


 


Blood Pressure 149/78 145/77


 


O2 Sat by Pulse 98 97





Oximetry  














- Reevaluation(s)


Reevaluation #1: 





20 17:14


Medical record is reviewed


Reevaluation #2: 





20 17:14


Patient's abdominal pain is significantly improved





Medical Decision Making





- Medical Decision Making





65 female DF for evaluation of abdominal pain no acute findings cause, patient 

will be discharged home





- Lab Data


Result diagrams: 


                                 20 14:24





                                 20 14:24


                                   Lab Results











  20 Range/Units





  14:24 14:24 14:24 


 


WBC  6.9    (3.8-10.6)  k/uL


 


RBC  5.15    (3.80-5.40)  m/uL


 


Hgb  14.1    (11.4-16.0)  gm/dL


 


Hct  43.9    (34.0-46.0)  %


 


MCV  85.3    (80.0-100.0)  fL


 


MCH  27.4    (25.0-35.0)  pg


 


MCHC  32.1    (31.0-37.0)  g/dL


 


RDW  15.2    (11.5-15.5)  %


 


Plt Count  193    (150-450)  k/uL


 


Neutrophils %  68    %


 


Lymphocytes %  24    %


 


Monocytes %  5    %


 


Eosinophils %  2    %


 


Basophils %  1    %


 


Neutrophils #  4.7    (1.3-7.7)  k/uL


 


Lymphocytes #  1.6    (1.0-4.8)  k/uL


 


Monocytes #  0.3    (0-1.0)  k/uL


 


Eosinophils #  0.1    (0-0.7)  k/uL


 


Basophils #  0.1    (0-0.2)  k/uL


 


Sodium   139   (137-145)  mmol/L


 


Potassium   4.8   (3.5-5.1)  mmol/L


 


Chloride   104   ()  mmol/L


 


Carbon Dioxide   25   (22-30)  mmol/L


 


Anion Gap   10   mmol/L


 


BUN   22 H   (7-17)  mg/dL


 


Creatinine   1.00   (0.52-1.04)  mg/dL


 


Est GFR (CKD-EPI)AfAm   69   (>60 ml/min/1.73 sqM)  


 


Est GFR (CKD-EPI)NonAf   60   (>60 ml/min/1.73 sqM)  


 


Glucose   109 H   (74-99)  mg/dL


 


Plasma Lactic Acid John    1.2  (0.7-2.0)  mmol/L


 


Calcium   10.8 H   (8.4-10.2)  mg/dL


 


Total Bilirubin   0.7   (0.2-1.3)  mg/dL


 


AST   34   (14-36)  U/L


 


ALT   38 H   (4-34)  U/L


 


Alkaline Phosphatase   139 H   ()  U/L


 


Creatine Kinase   47   ()  U/L


 


Total Protein   7.8   (6.3-8.2)  g/dL


 


Albumin   5.0   (3.5-5.0)  g/dL


 


Amylase   38   ()  U/L


 


Lipase   161   ()  U/L


 


Urine Color     


 


Urine Appearance     (Clear)  


 


Urine pH     (5.0-8.0)  


 


Ur Specific Gravity     (1.001-1.035)  


 


Urine Protein     (Negative)  


 


Urine Glucose (UA)     (Negative)  


 


Urine Ketones     (Negative)  


 


Urine Blood     (Negative)  


 


Urine Nitrite     (Negative)  


 


Urine Bilirubin     (Negative)  


 


Urine Urobilinogen     (<2.0)  mg/dL


 


Ur Leukocyte Esterase     (Negative)  














  20 Range/Units





  14:24 


 


WBC   (3.8-10.6)  k/uL


 


RBC   (3.80-5.40)  m/uL


 


Hgb   (11.4-16.0)  gm/dL


 


Hct   (34.0-46.0)  %


 


MCV   (80.0-100.0)  fL


 


MCH   (25.0-35.0)  pg


 


MCHC   (31.0-37.0)  g/dL


 


RDW   (11.5-15.5)  %


 


Plt Count   (150-450)  k/uL


 


Neutrophils %   %


 


Lymphocytes %   %


 


Monocytes %   %


 


Eosinophils %   %


 


Basophils %   %


 


Neutrophils #   (1.3-7.7)  k/uL


 


Lymphocytes #   (1.0-4.8)  k/uL


 


Monocytes #   (0-1.0)  k/uL


 


Eosinophils #   (0-0.7)  k/uL


 


Basophils #   (0-0.2)  k/uL


 


Sodium   (137-145)  mmol/L


 


Potassium   (3.5-5.1)  mmol/L


 


Chloride   ()  mmol/L


 


Carbon Dioxide   (22-30)  mmol/L


 


Anion Gap   mmol/L


 


BUN   (7-17)  mg/dL


 


Creatinine   (0.52-1.04)  mg/dL


 


Est GFR (CKD-EPI)AfAm   (>60 ml/min/1.73 sqM)  


 


Est GFR (CKD-EPI)NonAf   (>60 ml/min/1.73 sqM)  


 


Glucose   (74-99)  mg/dL


 


Plasma Lactic Acid John   (0.7-2.0)  mmol/L


 


Calcium   (8.4-10.2)  mg/dL


 


Total Bilirubin   (0.2-1.3)  mg/dL


 


AST   (14-36)  U/L


 


ALT   (4-34)  U/L


 


Alkaline Phosphatase   ()  U/L


 


Creatine Kinase   ()  U/L


 


Total Protein   (6.3-8.2)  g/dL


 


Albumin   (3.5-5.0)  g/dL


 


Amylase   ()  U/L


 


Lipase   ()  U/L


 


Urine Color  Colorless  


 


Urine Appearance  Clear  (Clear)  


 


Urine pH  5.0  (5.0-8.0)  


 


Ur Specific Gravity  1.007  (1.001-1.035)  


 


Urine Protein  Negative  (Negative)  


 


Urine Glucose (UA)  Negative  (Negative)  


 


Urine Ketones  Negative  (Negative)  


 


Urine Blood  Negative  (Negative)  


 


Urine Nitrite  Negative  (Negative)  


 


Urine Bilirubin  Negative  (Negative)  


 


Urine Urobilinogen  <2.0  (<2.0)  mg/dL


 


Ur Leukocyte Esterase  Negative  (Negative)  














- Radiology Data


Radiology results: report reviewed (CT abdomen and pelvis negative for acute 

disease), image reviewed





Disposition


Clinical Impression: 


 Abdominal pain





Disposition: HOME SELF-CARE


Condition: Good


Instructions (If sedation given, give patient instructions):  Abdominal Pain 

(ED)


Is patient prescribed a controlled substance at d/c from ED?: No


Referrals: 


[facility] [Primary Care Provider] - 1-2 days

## 2020-06-08 NOTE — CT
EXAMINATION TYPE: CT abdomen pelvis w con

 

DATE OF EXAM: 6/8/2020

 

COMPARISON: CT 12/26/2019

 

HISTORY: abdominal pain, diarrhea

 

CT DLP: 1430.3 mGycm

Automated exposure control for dose reduction was used.

 

TECHNIQUE:  Helical acquisition of images from the lung bases through the pelvis have been completed.


 

CONTRAST: 

Performed without Oral Contrast and with IV Contrast, patient injected with 100 mL of Isovue 300.

 

FINDINGS: 

 

LUNG BASES: No significant double change is appreciated, basilar emphysematous changes again noted, n
o pleural or pericardial effusion.

 

AORTA:  Aortobifemoral bypass graft is noted which is patent..

 

LIVER/GB: Liver shows low attenuation likely due to hepatic steatosis. Patient is post cholecystectom
y..

 

PANCREAS: No significant abnormality is seen.

 

SPLEEN: Punctate calcification may be indicative of old granulomatous disease.

 

ADRENALS: No significant abnormality is seen.

 

KIDNEYS: No significant abnormality is seen.

 

 

REPRODUCTIVE ORGANS: Not seen

 

BOWEL:  No significant abnormality is seen. Gastric wall thickening may be due to lack of distention.


 

FREE AIR:  No Free Air visible.

 

ASCITES:  None visible.

 

PELVIC ADENOPATHY:  None visualized.

 

RETROPERITONEAL ADENOPATHY:  No Retroperitoneal Adenopathy visible.

 

URINARY BLADDER:  No significant abnormality is seen.

 

OSSEOUS STRUCTURES:  Degenerative disc changes are noted at the lumbosacral junction.

 

IMPRESSION: 

HEPATIC STEATOSIS. POSTOP CHANGES. DEGENERATIVE DISC DISEASE.

## 2020-07-15 NOTE — AN
ANGIOGRAPHY REPORT



DATE OF SERVICE:

07/15/2020



PERFORMING PHYSICIAN:

Chris Palacios M.D.



PROCEDURE PERFORMED:

1. Abdominal aortogram.

2. Nonselective _____ angiogram.



INDICATION:

This is a 65-year-old female patient with history of coronary artery disease,

peripheral arterial disease and status post aortobifem bypass as well as hypertension

and dyslipidemia, who continues to have abdominal intermittent claudication symptoms.

She underwent a CTA which revealed subtotally occluded celiac trunk.  She was brought

today for an angiogram and possible intervention.



APPROACH:

Right brachial artery.



COMPLICATIONS:

None.



LEVEL OF SEDATION:

Moderate, with sedation length of 18 minutes.



PROCEDURE DESCRIPTION:

After obtaining informed consent, the patient was brought to the cardiac cath lab.  The

right brachial artery was cannulated using micropuncture technique under ultrasound

guidance. The micropuncture wire passed easily. Then I placed a 6-Slovenian sheath 11 cm

at the right brachial artery.



After that I did advance a 5-Slovenian pigtail catheter using an 0.035 stiff Glidewire all

the way to the abdominal aorta.  The catheter was placed at the level of the renal

arteries as well as at the level of the _____arteries.



An abdominal aortogram was performed using digital subtraction and using a power

injection in oblique view.



The procedure was completed without any complication.



SELECTIVE PERIPHERAL ANGIOGRAM:

1. The abdominal aorta appeared to be calcified with intermediate disease.  The

    patient has had aortobifem surgery in the past.

2. The celiac trunk appeared to be occluded with a flush occlusion.

3. The SMA appeared to have mild disease only.

4. The DARLINE was not opacified.



CONCLUSION:

1. Occluded celiac trunk.

2. Mild disease involving the superior mesenteric artery.

3. Occluded inferior mesenteric artery.



POST-PROCEDURE MANAGEMENT:

1. Medical treatment.

2. Follow up with the patient.





MMODL / IJN: [id number] / Job#: [id number]

## 2020-07-15 NOTE — IR
EXAMINATION TYPE: IR angio abdominal w serial

 

DATE OF EXAM: 7/15/2020

 

COMPARISON: CTA chest abdomen and pelvis 12/26/2019. CT abdomen and pelvis 6/8/2020.

 

HISTORY: Abdominal pain

 

TECHNIQUE: Fluoroscopy.

 

FINDINGS:  Fluoroscopic guidance was provided during procedure performed by Dr. Aranda.  A total of 2.
8 minutes of fluoroscopic time was utilized during the procedure and 47 total images were acquired.

 

IMPRESSION:  As Above.

## 2020-07-16 NOTE — US
EXAMINATION TYPE: US upper ext pseudo RT

 

DATE OF EXAM: 7/16/2020

 

COMPARISON: NONE

 

CLINICAL HISTORY: hematoma. Right arteriogram brachial approach pain and swelling at puncture site at
 elbow.

 

Right brachial artery is patent.

 

Grayscale, color Doppler, spectral Doppler imaging performed of the brachial artery.

 

No evident pseudoaneurysm at the level of patient's symptomatology, puncture site.

 

IMPRESSION:  No abnormality evident on this limited exam

## 2020-07-16 NOTE — P.DS
Providers


Date of admission: 


07/15/20 23:03


07/15/2020


Attending physician: 


Chris Palacios





Primary care physician: 


[facility] Course: 





This is a pleasant 65-year-old female patient who underwent yesterday and 

aortogram and nonselective mesenteric angiogram from the right brachial 

approach.  The patient underwent in the past and aortobifem bypass and we had 

some difficulties accessing her aorta from the right groin.  Because of that 

right brachial approach was advised.





The patient was seen today.  Yesterday after pulling the right brachial sheath 

she developed hematoma in the right arm which was compressed.





She was seen this morning.  She started having right arm pain.  The right hand 

is warm.  I am going to watch the patient for the rest of the day and send her 

home later on today if there is no new events.  I will also obtain a CBC to 

monitor hemoglobin.





Plan - Discharge Summary


Discharge Rx Participant: No


New Discharge Prescriptions: 


Continue


   Sertraline [Zoloft] 100 mg PO DAILY


   Pantoprazole Sodium [Protonix] 40 mg PO DAILY


   Cetirizine HCl [Zyrtec] 10 mg PO DAILY


   Nitroglycerin Sl Tabs [Nitrostat] 0.4 mg SUBLINGUAL Q5M PRN #25 tab


     PRN Reason: Chest Pain


   Clopidogrel [Plavix] 75 mg PO DAILY #30 tab


   Furosemide [Lasix] 20 mg PO DAILY


   Potassium Chloride [K-Tab ER] 20 meq PO DAILY


   Atorvastatin [Lipitor] 80 mg PO TUFR


   Valsartan [Diovan] 160 mg PO 1200


   Metoprolol Tartrate [Lopressor] 12.5 mg PO BID


Discharge Medication List





Cetirizine HCl [Zyrtec] 10 mg PO DAILY 11/17/19 [History]


Pantoprazole Sodium [Protonix] 40 mg PO DAILY 11/17/19 [History]


Sertraline [Zoloft] 100 mg PO DAILY 11/17/19 [History]


Clopidogrel [Plavix] 75 mg PO DAILY #30 tab 11/20/19 [Rx]


Nitroglycerin Sl Tabs [Nitrostat] 0.4 mg SUBLINGUAL Q5M PRN #25 tab 11/20/19 

[Rx]


Atorvastatin [Lipitor] 80 mg PO TUFR 06/08/20 [History]


Furosemide [Lasix] 20 mg PO DAILY 06/08/20 [History]


Potassium Chloride [K-Tab ER] 20 meq PO DAILY 06/08/20 [History]


Valsartan [Diovan] 160 mg PO 1200 07/08/20 [History]


Metoprolol Tartrate [Lopressor] 12.5 mg PO BID 07/13/20 [History]








Follow up Appointment(s)/Referral(s): 


Chris Palacios MD [STAFF PHYSICIAN] - 07/21/20 1:15 pm (APPOINTMENT MADE ON 

TUESDAY, JULY 21ST @ 1:15PM )


Patient Instructions/Handouts:  Peripheral Vascular Disease (ED), Procedural 

Sedation (ED)


Activity/Diet/Wound Care/Special Instructions: 


*NO LIFTING, PUSHING, OR PULLING ANYTHING OVER 5 POUNDS FOR 5 DAYS WITH YOUR 

RIGHT ARM.


*NO DRIVING FOR 3 DAYS


*YOU CAN SHOWER TOMORROW BUT DO NOT SUBMERSE YOUR PUNCTURE SITE IN WATER FOR A 

FEW DAYS TO PREVENT INFECTION - SO NO TUB BATHS, POOLS, HOT TUBS....ETC.


*ANY SIGNS OF BLEEDING (HARDNESS, SWELLING, OR EXCESSIVE BRUISING) HOLD DIRECT 

PRESSURE ON YOUR PUNCTURE SITE AND COME TO THE NEAREST EMERGENCY ROOM TO GET 

YOUR PUNCTURE SITE LOOKED AT - DO NOT DRIVE YOURSELF! EITHER CALL EMS OR HAVE 

SOMEONE DRIVE YOU!

## 2020-07-17 NOTE — P.PN
Progress Note - Text


Progress Note Date: 07/17/20





This is a very pleasant 65-year-old female patient was admitted to the hospital 

2 days ago and underwent an abdominal aortogram and nonselective eccentric 

angiogram for abdominal intermittent claudication.





The procedure was performed from right brachial approach.  The procedure was 

complicated by right brachial hematoma when the sheath was pulled.  Because of 

that the care of the patient one more day overnight.





She was seen today.  The hematoma is not enlarging and has been stable.  The pa

tient continues to have right arm pain.  Beside that the creatinine came in to 

be at 2 and I am going to repeat the creatinine and hold losartan at this point.





I am going to follow-up with the patient throughout the day today and based on 

her creatinine and how she is doing decided about sending the patient home or 

keeping her overnight one more day.